# Patient Record
Sex: MALE | Race: WHITE | Employment: FULL TIME | URBAN - METROPOLITAN AREA
[De-identification: names, ages, dates, MRNs, and addresses within clinical notes are randomized per-mention and may not be internally consistent; named-entity substitution may affect disease eponyms.]

---

## 2018-10-27 ENCOUNTER — HOSPITAL ENCOUNTER (OUTPATIENT)
Age: 58
Setting detail: OBSERVATION
Discharge: HOME | End: 2018-10-28
Attending: EMERGENCY MEDICINE | Admitting: INTERNAL MEDICINE
Payer: COMMERCIAL

## 2018-10-27 DIAGNOSIS — R07.9 ACUTE CHEST PAIN: Primary | ICD-10-CM

## 2018-10-27 PROBLEM — I20.0 UNSTABLE ANGINA (HCC): Status: ACTIVE | Noted: 2018-10-27

## 2018-10-27 LAB
ALBUMIN SERPL-MCNC: 3.9 G/DL (ref 3.5–5)
ALBUMIN/GLOB SERPL: 1.1 {RATIO} (ref 1.1–2.2)
ALP SERPL-CCNC: 65 U/L (ref 45–117)
ALT SERPL-CCNC: 41 U/L (ref 12–78)
ANION GAP SERPL CALC-SCNC: 15 MMOL/L (ref 5–15)
AST SERPL-CCNC: 21 U/L (ref 15–37)
BASOPHILS # BLD: 0 K/UL (ref 0–0.1)
BASOPHILS NFR BLD: 1 % (ref 0–1)
BILIRUB SERPL-MCNC: 0.5 MG/DL (ref 0.2–1)
BUN SERPL-MCNC: 19 MG/DL (ref 6–20)
BUN/CREAT SERPL: 15 (ref 12–20)
CALCIUM SERPL-MCNC: 8.6 MG/DL (ref 8.5–10.1)
CHLORIDE SERPL-SCNC: 103 MMOL/L (ref 97–108)
CO2 SERPL-SCNC: 22 MMOL/L (ref 21–32)
COMMENT, HOLDF: NORMAL
CREAT SERPL-MCNC: 1.24 MG/DL (ref 0.7–1.3)
DIFFERENTIAL METHOD BLD: ABNORMAL
EOSINOPHIL # BLD: 0.3 K/UL (ref 0–0.4)
EOSINOPHIL NFR BLD: 4 % (ref 0–7)
ERYTHROCYTE [DISTWIDTH] IN BLOOD BY AUTOMATED COUNT: 13.2 % (ref 11.5–14.5)
GLOBULIN SER CALC-MCNC: 3.6 G/DL (ref 2–4)
GLUCOSE BLD STRIP.AUTO-MCNC: 103 MG/DL (ref 65–100)
GLUCOSE BLD STRIP.AUTO-MCNC: 132 MG/DL (ref 65–100)
GLUCOSE SERPL-MCNC: 98 MG/DL (ref 65–100)
HCT VFR BLD AUTO: 47.8 % (ref 36.6–50.3)
HGB BLD-MCNC: 16.8 G/DL (ref 12.1–17)
IMM GRANULOCYTES # BLD: 0 K/UL (ref 0–0.04)
IMM GRANULOCYTES NFR BLD AUTO: 1 % (ref 0–0.5)
LYMPHOCYTES # BLD: 1.5 K/UL (ref 0.8–3.5)
LYMPHOCYTES NFR BLD: 23 % (ref 12–49)
MCH RBC QN AUTO: 31.2 PG (ref 26–34)
MCHC RBC AUTO-ENTMCNC: 35.1 G/DL (ref 30–36.5)
MCV RBC AUTO: 88.7 FL (ref 80–99)
MONOCYTES # BLD: 0.7 K/UL (ref 0–1)
MONOCYTES NFR BLD: 11 % (ref 5–13)
NEUTS SEG # BLD: 4.1 K/UL (ref 1.8–8)
NEUTS SEG NFR BLD: 61 % (ref 32–75)
NRBC # BLD: 0 K/UL (ref 0–0.01)
NRBC BLD-RTO: 0 PER 100 WBC
PLATELET # BLD AUTO: 212 K/UL (ref 150–400)
PMV BLD AUTO: 9.5 FL (ref 8.9–12.9)
POTASSIUM SERPL-SCNC: 3.4 MMOL/L (ref 3.5–5.1)
PROT SERPL-MCNC: 7.5 G/DL (ref 6.4–8.2)
RBC # BLD AUTO: 5.39 M/UL (ref 4.1–5.7)
SAMPLES BEING HELD,HOLD: NORMAL
SERVICE CMNT-IMP: ABNORMAL
SERVICE CMNT-IMP: ABNORMAL
SODIUM SERPL-SCNC: 140 MMOL/L (ref 136–145)
TROPONIN I SERPL-MCNC: <0.05 NG/ML
WBC # BLD AUTO: 6.7 K/UL (ref 4.1–11.1)

## 2018-10-27 PROCEDURE — 74011636320 HC RX REV CODE- 636/320: Performed by: INTERNAL MEDICINE

## 2018-10-27 PROCEDURE — 77030013744

## 2018-10-27 PROCEDURE — 99218 HC RM OBSERVATION: CPT

## 2018-10-27 PROCEDURE — C1887 CATHETER, GUIDING: HCPCS

## 2018-10-27 PROCEDURE — 82962 GLUCOSE BLOOD TEST: CPT

## 2018-10-27 PROCEDURE — 74011250637 HC RX REV CODE- 250/637: Performed by: INTERNAL MEDICINE

## 2018-10-27 PROCEDURE — 74011250637 HC RX REV CODE- 250/637: Performed by: EMERGENCY MEDICINE

## 2018-10-27 PROCEDURE — 36415 COLL VENOUS BLD VENIPUNCTURE: CPT | Performed by: EMERGENCY MEDICINE

## 2018-10-27 PROCEDURE — 85025 COMPLETE CBC W/AUTO DIFF WBC: CPT | Performed by: EMERGENCY MEDICINE

## 2018-10-27 PROCEDURE — 93005 ELECTROCARDIOGRAM TRACING: CPT

## 2018-10-27 PROCEDURE — 74011250636 HC RX REV CODE- 250/636: Performed by: INTERNAL MEDICINE

## 2018-10-27 PROCEDURE — C1769 GUIDE WIRE: HCPCS

## 2018-10-27 PROCEDURE — 77030004533 HC CATH ANGI DX IMP BSC -B

## 2018-10-27 PROCEDURE — 74011000258 HC RX REV CODE- 258: Performed by: INTERNAL MEDICINE

## 2018-10-27 PROCEDURE — 92928 PRQ TCAT PLMT NTRAC ST 1 LES: CPT

## 2018-10-27 PROCEDURE — 77030013715 HC INFL SYS MRTM -B

## 2018-10-27 PROCEDURE — C1760 CLOSURE DEV, VASC: HCPCS

## 2018-10-27 PROCEDURE — C1874 STENT, COATED/COV W/DEL SYS: HCPCS

## 2018-10-27 PROCEDURE — 99285 EMERGENCY DEPT VISIT HI MDM: CPT

## 2018-10-27 PROCEDURE — 84484 ASSAY OF TROPONIN QUANT: CPT | Performed by: EMERGENCY MEDICINE

## 2018-10-27 PROCEDURE — C1894 INTRO/SHEATH, NON-LASER: HCPCS

## 2018-10-27 PROCEDURE — 80053 COMPREHEN METABOLIC PANEL: CPT | Performed by: EMERGENCY MEDICINE

## 2018-10-27 PROCEDURE — C1725 CATH, TRANSLUMIN NON-LASER: HCPCS

## 2018-10-27 RX ORDER — METOPROLOL SUCCINATE 25 MG/1
25 TABLET, EXTENDED RELEASE ORAL DAILY
Status: DISCONTINUED | OUTPATIENT
Start: 2018-10-28 | End: 2018-10-28 | Stop reason: HOSPADM

## 2018-10-27 RX ORDER — ATROPINE SULFATE 0.1 MG/ML
.5-1 INJECTION INTRAVENOUS AS NEEDED
Status: DISCONTINUED | OUTPATIENT
Start: 2018-10-27 | End: 2018-10-27

## 2018-10-27 RX ORDER — POTASSIUM CHLORIDE 750 MG/1
20 TABLET, FILM COATED, EXTENDED RELEASE ORAL DAILY
Status: DISCONTINUED | OUTPATIENT
Start: 2018-10-28 | End: 2018-10-28 | Stop reason: HOSPADM

## 2018-10-27 RX ORDER — PRASUGREL 10 MG/1
10 TABLET, FILM COATED ORAL DAILY
Status: DISCONTINUED | OUTPATIENT
Start: 2018-10-28 | End: 2018-10-28 | Stop reason: HOSPADM

## 2018-10-27 RX ORDER — PHENYLEPHRINE HCL IN 0.9% NACL 0.4MG/10ML
100-400 SYRINGE (ML) INTRAVENOUS
Status: DISCONTINUED | OUTPATIENT
Start: 2018-10-27 | End: 2018-10-27

## 2018-10-27 RX ORDER — SODIUM CHLORIDE 9 MG/ML
3 INJECTION, SOLUTION INTRAVENOUS CONTINUOUS
Status: DISPENSED | OUTPATIENT
Start: 2018-10-27 | End: 2018-10-27

## 2018-10-27 RX ORDER — SODIUM CHLORIDE 0.9 % (FLUSH) 0.9 %
5-10 SYRINGE (ML) INJECTION AS NEEDED
Status: DISCONTINUED | OUTPATIENT
Start: 2018-10-27 | End: 2018-10-28 | Stop reason: HOSPADM

## 2018-10-27 RX ORDER — HEPARIN SODIUM 200 [USP'U]/100ML
2000 INJECTION, SOLUTION INTRAVENOUS AS NEEDED
Status: DISCONTINUED | OUTPATIENT
Start: 2018-10-27 | End: 2018-10-27

## 2018-10-27 RX ORDER — PRASUGREL 10 MG/1
10-60 TABLET, FILM COATED ORAL AS NEEDED
Status: DISCONTINUED | OUTPATIENT
Start: 2018-10-27 | End: 2018-10-27

## 2018-10-27 RX ORDER — SODIUM CHLORIDE 0.9 % (FLUSH) 0.9 %
5-10 SYRINGE (ML) INJECTION EVERY 8 HOURS
Status: DISCONTINUED | OUTPATIENT
Start: 2018-10-27 | End: 2018-10-28 | Stop reason: HOSPADM

## 2018-10-27 RX ORDER — LANOLIN ALCOHOL/MO/W.PET/CERES
500 CREAM (GRAM) TOPICAL
Status: DISCONTINUED | OUTPATIENT
Start: 2018-10-27 | End: 2018-10-28 | Stop reason: HOSPADM

## 2018-10-27 RX ORDER — FENTANYL CITRATE 50 UG/ML
25-200 INJECTION, SOLUTION INTRAMUSCULAR; INTRAVENOUS AS NEEDED
Status: DISCONTINUED | OUTPATIENT
Start: 2018-10-27 | End: 2018-10-27

## 2018-10-27 RX ORDER — HEPARIN SODIUM 1000 [USP'U]/ML
1000-10000 INJECTION, SOLUTION INTRAVENOUS; SUBCUTANEOUS AS NEEDED
Status: DISCONTINUED | OUTPATIENT
Start: 2018-10-27 | End: 2018-10-27

## 2018-10-27 RX ORDER — CLOPIDOGREL 300 MG/1
300-600 TABLET, FILM COATED ORAL AS NEEDED
Status: DISCONTINUED | OUTPATIENT
Start: 2018-10-27 | End: 2018-10-27

## 2018-10-27 RX ORDER — SODIUM CHLORIDE 9 MG/ML
1.5 INJECTION, SOLUTION INTRAVENOUS CONTINUOUS
Status: DISPENSED | OUTPATIENT
Start: 2018-10-27 | End: 2018-10-27

## 2018-10-27 RX ORDER — ATORVASTATIN CALCIUM 20 MG/1
20 TABLET, FILM COATED ORAL EVERY EVENING
Status: DISCONTINUED | OUTPATIENT
Start: 2018-10-27 | End: 2018-10-28 | Stop reason: HOSPADM

## 2018-10-27 RX ORDER — ASPIRIN 81 MG/1
81 TABLET ORAL DAILY
Status: DISCONTINUED | OUTPATIENT
Start: 2018-10-28 | End: 2018-10-28 | Stop reason: HOSPADM

## 2018-10-27 RX ORDER — MIDAZOLAM HYDROCHLORIDE 1 MG/ML
.5-1 INJECTION, SOLUTION INTRAMUSCULAR; INTRAVENOUS AS NEEDED
Status: DISCONTINUED | OUTPATIENT
Start: 2018-10-27 | End: 2018-10-27

## 2018-10-27 RX ORDER — LIDOCAINE HYDROCHLORIDE 10 MG/ML
10-30 INJECTION INFILTRATION; PERINEURAL
Status: DISCONTINUED | OUTPATIENT
Start: 2018-10-27 | End: 2018-10-27

## 2018-10-27 RX ORDER — SODIUM CHLORIDE 0.9 % (FLUSH) 0.9 %
5-10 SYRINGE (ML) INJECTION AS NEEDED
Status: DISCONTINUED | OUTPATIENT
Start: 2018-10-27 | End: 2018-10-27

## 2018-10-27 RX ADMIN — FENTANYL CITRATE 50 MCG: 50 INJECTION INTRAMUSCULAR; INTRAVENOUS at 16:02

## 2018-10-27 RX ADMIN — IOPAMIDOL 50 ML: 755 INJECTION, SOLUTION INTRAVENOUS at 16:13

## 2018-10-27 RX ADMIN — IOPAMIDOL 109 ML: 755 INJECTION, SOLUTION INTRAVENOUS at 16:27

## 2018-10-27 RX ADMIN — LIDOCAINE HYDROCHLORIDE 10 ML: 10 INJECTION, SOLUTION INFILTRATION; PERINEURAL at 16:02

## 2018-10-27 RX ADMIN — NITROGLYCERIN 1 INCH: 20 OINTMENT TOPICAL at 14:41

## 2018-10-27 RX ADMIN — SODIUM CHLORIDE 3 ML/KG/HR: 900 INJECTION, SOLUTION INTRAVENOUS at 16:02

## 2018-10-27 RX ADMIN — MIDAZOLAM 2 MG: 1 INJECTION INTRAMUSCULAR; INTRAVENOUS at 16:06

## 2018-10-27 RX ADMIN — PRASUGREL HYDROCHLORIDE 60 MG: 10 TABLET, FILM COATED ORAL at 16:23

## 2018-10-27 RX ADMIN — Medication 10 ML: at 16:02

## 2018-10-27 RX ADMIN — Medication 10 ML: at 21:12

## 2018-10-27 RX ADMIN — Medication 2000 UNITS: at 16:02

## 2018-10-27 RX ADMIN — FENTANYL CITRATE 25 MCG: 50 INJECTION INTRAMUSCULAR; INTRAVENOUS at 16:06

## 2018-10-27 RX ADMIN — MIDAZOLAM 1 MG: 1 INJECTION INTRAMUSCULAR; INTRAVENOUS at 16:04

## 2018-10-27 RX ADMIN — BIVALIRUDIN 152.43 MG/HR: 250 INJECTION, POWDER, LYOPHILIZED, FOR SOLUTION INTRAVENOUS at 16:13

## 2018-10-27 RX ADMIN — FENTANYL CITRATE 25 MCG: 50 INJECTION INTRAMUSCULAR; INTRAVENOUS at 16:04

## 2018-10-27 RX ADMIN — ATORVASTATIN CALCIUM 20 MG: 20 TABLET, FILM COATED ORAL at 19:11

## 2018-10-27 RX ADMIN — MIDAZOLAM 2 MG: 1 INJECTION INTRAMUSCULAR; INTRAVENOUS at 16:02

## 2018-10-27 RX ADMIN — Medication 500 MG: at 21:12

## 2018-10-27 NOTE — PROGRESS NOTES
TRANSFER - OUT REPORT: 
 
Verbal report given to Kenia(name) on Salome Ram.  being transferred to [facility](unit) for routine progression of care Report consisted of patients Situation, Background, Assessment and  
Recommendations(SBAR). Information from the following report(s) Procedure Summary and MAR was reviewed with the receiving nurse. Lines:  
Peripheral IV 10/27/18 Left Hand (Active) Site Assessment Clean, dry, & intact 10/27/2018  3:27 PM  
Phlebitis Assessment 0 10/27/2018  3:27 PM  
Infiltration Assessment 0 10/27/2018  3:27 PM  
Dressing Status Clean, dry, & intact 10/27/2018  3:27 PM  
Dressing Type Tape;Transparent 10/27/2018  3:27 PM  
Hub Color/Line Status Green;Capped;Flushed;Patent 10/27/2018  3:27 PM  
Action Taken Blood drawn 10/27/2018  3:27 PM  
  
 
Opportunity for questions and clarification was provided. Patient transported with: 
 Registered Nurse

## 2018-10-27 NOTE — H&P
Cardiology History and Physical 
 
 Date of  Admission: 10/27/2018 Admission type: Emergency Mustapha Randall. is a 62 y.o. male  is admitted for chest pain. It started at 11:00 at work. He was climbing stairs when it happened. It is SS tightness with Lt jaw radiation and Lt shoulder radiation with SOB. En route he was given a sl NTG which helped some but CP is still 4/10. He has known CAD and s/p LAD stent in 12/16. He has not had any stress test since then. He has chest fluttering like palpitations at rest lately. There are no active problems to display for this patient. Rachel Wooten DO Past Medical History:  
Diagnosis Date  Diabetes ([facility] [address]  Hypertension  Ill-defined condition   
 elevated cholesterol History reviewed. No pertinent family history. Prior to Admission medications Medication Sig Start Date End Date Taking? Authorizing Provider  
atorvastatin (LIPITOR) 40 mg tablet Take 0.5 Tabs by mouth every evening. 12/3/16   Surjit Carlson MD  
prasugrel (EFFIENT) 10 mg tablet Take 1 Tab by mouth daily. 12/3/16   Surjit Carlosn MD  
potassium chloride (KLOR-CON M20) 20 mEq tablet Take 1 Tab by mouth daily. Take 2 tablet this evening then 1 tablet every day until finished 12/3/16   Surjit Carlson MD  
aspirin delayed-release 81 mg tablet Take  by mouth daily. Provider, Historical  
CANAGLIFLOZIN (INVOKANA PO) Take  by mouth. Provider, Historical  
atenolol-chlorthalidone (TENORETIC) 100-25 mg per tablet Take 1 Tab by mouth daily. Provider, Historical  
niacin ER (NIASPAN) 500 mg tablet Take 500 mg by mouth nightly. Provider, Historical  
  
No Known Allergies Review of Systems Review of Systems Except as noted in HPI, patient denies recent fever or chills, nausea, vomiting, diarrhea, hemoptysis, hematemesis, dysuria, myalgias, focal neurologic symptoms, ecchymosis, angioedema, odynophagia, dysphagia, sore throat, earache,rash, melena, hematochezia, depression, GERD, cold intolerance, petechia, bleeding gums, or significant weight loss. A comprehensive review of systems was negative except for that written in the HPI. Physical Exam 
 
Objective:  
 Physical Exam: 
24 hr VS reviewed, overall VSSAF Temp (24hrs), Av.5 °F (36.9 °C), Min:98.5 °F (36.9 °C), Max:98.5 °F (36.9 °C) Patient Vitals for the past 8 hrs: 
 Pulse 10/27/18 1445 62  
10/27/18 1441 62  
10/27/18 1430 65  
10/27/18 1415 61  
10/27/18 1405 60 Patient Vitals for the past 8 hrs: 
 Resp  
10/27/18 1445 15  
10/27/18 1430 14  
10/27/18 1415 16  
10/27/18 1405 12 Patient Vitals for the past 8 hrs: 
 BP  
10/27/18 1445 134/75  
10/27/18 1441 124/74  
10/27/18 1430 124/74  
10/27/18 1415 126/74  
10/27/18 1405 138/70 No intake or output data in the 24 hours ending 10/27/18 1451 General Appearance:   [x] well developed, well nourished,  [x]NAD. []     agitated   []     lethargic but arousable  []     obtunded ENT, Palate:    [x] WNL   []     dry palate/MM     [x]anicteric Respiratory:    [x]CTA bilateral  [] rales  [] rhonchi  [] normal resp effort Cardiovascular:   [x] RRR   [] Irregular rate and rhythm  [] ectopy [x] Normal S1, S2   [x]  S4 gallop noted 
 [] no murmur   [] murmur c/w: 
 [x]  no edema RLE:[]1+ [] 2+ []3+; LLE:[]1+ []2+ [] 3+ [x]  normal JVP   []     Elevated JVP [x] carotid upstroke nl 
 [x] abd aorta not palpated 
 [x] no stigmata of peripheral emboli GI:    [x] abd soft, non-distented,bowel sounds present, no                     organomegaly appreciated Skin: 
Neuro: 
 
  [x]  warm and dry []     cold extremities [x]  A/O x 3,  [x]     grossly non-focal  
 []  lethargic but arousable  [] obtunded Cardiographics Telemetry: normal sinus rhythm ECG: normal EKG, normal sinus rhythm, unchanged from previous tracings Echocardiogram: Not done Labs: Recent Results (from the past 24 hour(s)) EKG, 12 LEAD, INITIAL Collection Time: 10/27/18  2:03 PM  
Result Value Ref Range Ventricular Rate 60 BPM  
 Atrial Rate 60 BPM  
 P-R Interval 180 ms QRS Duration 86 ms  
 Q-T Interval 422 ms QTC Calculation (Bezet) 422 ms Calculated P Axis 25 degrees Calculated T Axis 22 degrees Diagnosis Normal sinus rhythm When compared with ECG of 03-DEC-2016 12:18, No significant change was found CBC WITH AUTOMATED DIFF Collection Time: 10/27/18  2:10 PM  
Result Value Ref Range WBC 6.7 4.1 - 11.1 K/uL  
 RBC 5.39 4. 10 - 5.70 M/uL  
 HGB 16.8 12.1 - 17.0 g/dL HCT 47.8 36.6 - 50.3 % MCV 88.7 80.0 - 99.0 FL  
 MCH 31.2 26.0 - 34.0 PG  
 MCHC 35.1 30.0 - 36.5 g/dL  
 RDW 13.2 11.5 - 14.5 % PLATELET 933 011 - 867 K/uL MPV 9.5 8.9 - 12.9 FL  
 NRBC 0.0 0  WBC ABSOLUTE NRBC 0.00 0.00 - 0.01 K/uL NEUTROPHILS 61 32 - 75 % LYMPHOCYTES 23 12 - 49 % MONOCYTES 11 5 - 13 % EOSINOPHILS 4 0 - 7 % BASOPHILS 1 0 - 1 % IMMATURE GRANULOCYTES 1 (H) 0.0 - 0.5 % ABS. NEUTROPHILS 4.1 1.8 - 8.0 K/UL  
 ABS. LYMPHOCYTES 1.5 0.8 - 3.5 K/UL  
 ABS. MONOCYTES 0.7 0.0 - 1.0 K/UL  
 ABS. EOSINOPHILS 0.3 0.0 - 0.4 K/UL  
 ABS. BASOPHILS 0.0 0.0 - 0.1 K/UL  
 ABS. IMM. GRANS. 0.0 0.00 - 0.04 K/UL  
 DF AUTOMATED METABOLIC PANEL, COMPREHENSIVE Collection Time: 10/27/18  2:10 PM  
Result Value Ref Range Sodium 140 136 - 145 mmol/L Potassium 3.4 (L) 3.5 - 5.1 mmol/L Chloride 103 97 - 108 mmol/L  
 CO2 22 21 - 32 mmol/L Anion gap 15 5 - 15 mmol/L Glucose 98 65 - 100 mg/dL BUN 19 6 - 20 MG/DL Creatinine 1.24 0.70 - 1.30 MG/DL  
 BUN/Creatinine ratio 15 12 - 20 GFR est AA >60 >60 ml/min/1.73m2 GFR est non-AA 60 (L) >60 ml/min/1.73m2 Calcium 8.6 8.5 - 10.1 MG/DL Bilirubin, total 0.5 0.2 - 1.0 MG/DL  
 ALT (SGPT) 41 12 - 78 U/L  
 AST (SGOT) 21 15 - 37 U/L Alk.  phosphatase 65 45 - 117 U/L  
 Protein, total 7.5 6.4 - 8.2 g/dL Albumin 3.9 3.5 - 5.0 g/dL Globulin 3.6 2.0 - 4.0 g/dL A-G Ratio 1.1 1.1 - 2.2    
TROPONIN I Collection Time: 10/27/18  2:10 PM  
Result Value Ref Range Troponin-I, Qt. <0.05 <0.05 ng/mL SAMPLES BEING HELD Collection Time: 10/27/18  2:10 PM  
Result Value Ref Range SAMPLES BEING HELD 1RED,1BLUE   
 COMMENT Add-on orders for these samples will be processed based on acceptable specimen integrity and analyte stability, which may vary by analyte. Assessment: 
 
 Assessment:  
  
Active Problems: * No active hospital problems. * 
CP; radiating to Lt jaw; consistent with [place] 
CAD S/p stent in LAD 
HTN 
hyperlipidemia Plan:  
Tele NTP 
BB 
ASA He is still having ongoing chest pain and thus he is recommended for cardiac cath Signed By: Felix Parsons MD   
 October 27, 2018

## 2018-10-27 NOTE — ED TRIAGE NOTES
Pt arrives via EMS from work for substernal CP with radiation to jaw. Four 81mg chewable aspirin given prior to EMS arrival, 1 nitro given by EMS relieving jaw pain but still chest pressure. Hx of stent placement.

## 2018-10-27 NOTE — PROCEDURES
Cardiac Catheterization Procedure Note   Patient: Alhaji Castellanos MRN: [id number]  SSN: [id number]   YOB: 1960 Age: 62 y.o.   Sex: male    Date of Procedure: 10/27/2018   Pre-procedure Diagnosis: Unstable Angina  Post-procedure Diagnosis: Coronary Artery Disease  Procedure: PCI  :  Dr. Criss Paniagua MD    Assistant(s):  None  Anesthesia: Moderate Sedation   Estimated Blood Loss: Less than 10 mL   Specimens Removed: None  Findings: LVG; 60%; EDP is 8 mmHg; LM is normal; LCX with minimal lesion; LAD with proximal stent widely patent and small D1 has origin 75% lesion but trapped by LAD stent and small branch, not worth treating; RCA is huge dominant vessel with proximal 85% lesion, stented today with 3.5x22 [place] upto 15 roshan with excellent result  Complications: None   Implants:  None  Signed by:  Criss Paniagua MD  10/27/2018  4:30 PM

## 2018-10-27 NOTE — PROGRESS NOTES
Cardiac Cath Lab Procedure Area Arrival Note: 
 
Telma Norman. arrived to Cardiac Cath Lab, Procedure Area. Patient identifiers verified with NAME and DATE OF BIRTH. Procedure verified with patient. Consent forms verified. Allergies verified. Patient informed of procedure and plan of care. Questions answered with review. Patient voiced understanding of procedure and plan of care. Patient on cardiac monitor, non-invasive blood pressure, SPO2 monitor. On O2 @ 2 lpm via NC.  IV of NS on pump at 261 ml/hr. Patient status doing well without problems. Patient is A&Ox 4. Patient reports 7/10 chest pain. Patient medicated during procedure with orders obtained and verified by Dr. Jake Alcocer. Refer to patients Cardiac Cath Lab PROCEDURE REPORT for vital signs, assessment, status, and response during procedure, printed at end of case. Printed report on chart or scanned into chart.

## 2018-10-27 NOTE — ED PROVIDER NOTES
62 y.o. male with past medical history significant for HTN, DM, hypercholesterolemia who presents via EMS with chief complaint of CP. Pt c/o acute onset medial CP with radiation to his L jaw and L neck at 1100 this AM right after going up two flights of stairs. Pt reports that the pain has improved, and is now a 4/10. Pt denies associated SOB or diaphoresis. Pt had a stent placed by Dr. Alexander Love in 2016 but reports these sx do not feel similar. Pt denies hx of MI. Pt endorses family hx of MI (paternal, age 39). Pt has had intermittent palpitations recently and has an appointment to see Dr. Alexander Love on Wednesday. There are no other acute medical concerns at this time. Social hx: denies tobacco use, +occasional EtOH consumption PCP: Emerita Barrett DO Note written by Gilberto Sanon, as dictated by Delaney Phillips MD 2:28 PM 
 
 
 
The history is provided by the patient. No  was used. Past Medical History:  
Diagnosis Date  Diabetes ([address]  Hypertension  Ill-defined condition   
 elevated cholesterol Past Surgical History:  
Procedure Laterality Date  HX CAROTID STENT  12/2016 Dr Alexander Love History reviewed. No pertinent family history. Social History Socioeconomic History  Marital status:  Spouse name: Not on file  Number of children: Not on file  Years of education: Not on file  Highest education level: Not on file Social Needs  Financial resource strain: Not on file  Food insecurity - worry: Not on file  Food insecurity - inability: Not on file  Transportation needs - medical: Not on file  Transportation needs - non-medical: Not on file Occupational History  Not on file Tobacco Use  Smoking status: Never Smoker  Smokeless tobacco: Never Used Substance and Sexual Activity  Alcohol use: Yes Comment: occasionally  Drug use: No  
 Sexual activity: Not on file Other Topics Concern  Not on file Social History Narrative  Not on file ALLERGIES: Patient has no known allergies. Review of Systems Constitutional: Negative for appetite change, chills and fever. HENT: Negative for rhinorrhea, sore throat and trouble swallowing. Eyes: Negative for photophobia. Respiratory: Negative for cough and shortness of breath. Cardiovascular: Positive for chest pain and palpitations. Gastrointestinal: Negative for abdominal pain, nausea and vomiting. Genitourinary: Negative for dysuria, frequency and hematuria. Musculoskeletal: Positive for neck pain. Negative for arthralgias. Neurological: Negative for dizziness, syncope and weakness. Psychiatric/Behavioral: Negative for behavioral problems. The patient is not nervous/anxious. All other systems reviewed and are negative. Vitals:  
 10/27/18 1405 BP: 138/70 Pulse: 60 Resp: 12 Temp: 98.5 °F (36.9 °C) SpO2: 93% Weight: 87.1 kg (192 lb) Height: 5' 7\" (1.702 m) Physical Exam  
Constitutional: He appears well-developed and well-nourished. No distress. HENT:  
Head: Normocephalic. Eyes: Pupils are equal, round, and reactive to light. Neck: Normal range of motion. Cardiovascular: Normal rate and regular rhythm. No murmur heard. Pulmonary/Chest: Effort normal and breath sounds normal. No respiratory distress. Abdominal: Soft. There is no tenderness. Musculoskeletal: Normal range of motion. He exhibits no edema. Neurological: He is alert. He has normal strength. No cranial nerve deficit. Skin: Skin is warm and dry. Psychiatric: He has a normal mood and affect. His behavior is normal.  
Nursing note and vitals reviewed. Note written by Gilberto Vazquez, as dictated by Hugo Perea MD 2:39 PM 
 
 
 
 
MDM Procedures CONSULT NOTE: 
2:39 PM Hugo Perea MD spoke with Dr. Parker Mishra, Consult for Cardiology. Discussed available diagnostic tests and clinical findings. Dr. Pancho Lr will come see the pt. ED EKG interpretation: 
Rhythm: normal sinus rhythm; and regular . Rate (approx.): 60; Axis: normal; P wave: normal; QRS interval: normal ; ST/T wave: normal; in  Lead: ; Other findings: . This EKG was interpreted by Pilar Porter MD,ED Provider.  3:26 PM

## 2018-10-28 VITALS
OXYGEN SATURATION: 92 % | DIASTOLIC BLOOD PRESSURE: 94 MMHG | RESPIRATION RATE: 16 BRPM | WEIGHT: 192 LBS | TEMPERATURE: 97.6 F | HEIGHT: 67 IN | BODY MASS INDEX: 30.13 KG/M2 | HEART RATE: 60 BPM | SYSTOLIC BLOOD PRESSURE: 142 MMHG

## 2018-10-28 LAB
ATRIAL RATE: 60 BPM
CALCULATED P AXIS, ECG09: 25 DEGREES
CALCULATED T AXIS, ECG11: 22 DEGREES
DIAGNOSIS, 93000: NORMAL
GLUCOSE BLD STRIP.AUTO-MCNC: 129 MG/DL (ref 65–100)
P-R INTERVAL, ECG05: 180 MS
Q-T INTERVAL, ECG07: 422 MS
QRS DURATION, ECG06: 86 MS
QTC CALCULATION (BEZET), ECG08: 422 MS
SERVICE CMNT-IMP: ABNORMAL
TROPONIN I SERPL-MCNC: 0.42 NG/ML
VENTRICULAR RATE, ECG03: 60 BPM

## 2018-10-28 PROCEDURE — 74011250637 HC RX REV CODE- 250/637: Performed by: INTERNAL MEDICINE

## 2018-10-28 PROCEDURE — 82962 GLUCOSE BLOOD TEST: CPT

## 2018-10-28 PROCEDURE — 36415 COLL VENOUS BLD VENIPUNCTURE: CPT | Performed by: INTERNAL MEDICINE

## 2018-10-28 PROCEDURE — 84484 ASSAY OF TROPONIN QUANT: CPT | Performed by: INTERNAL MEDICINE

## 2018-10-28 PROCEDURE — 99218 HC RM OBSERVATION: CPT

## 2018-10-28 RX ORDER — PRASUGREL 10 MG/1
10 TABLET, FILM COATED ORAL DAILY
Qty: 30 TAB | Refills: 6 | Status: SHIPPED | OUTPATIENT
Start: 2018-10-28

## 2018-10-28 RX ADMIN — ATENOLOL: 50 TABLET ORAL at 08:04

## 2018-10-28 RX ADMIN — ASPIRIN 81 MG: 81 TABLET, COATED ORAL at 09:38

## 2018-10-28 RX ADMIN — POTASSIUM CHLORIDE 20 MEQ: 750 TABLET, FILM COATED, EXTENDED RELEASE ORAL at 08:05

## 2018-10-28 RX ADMIN — METOPROLOL SUCCINATE 25 MG: 25 TABLET, EXTENDED RELEASE ORAL at 08:04

## 2018-10-28 RX ADMIN — PRASUGREL HYDROCHLORIDE 10 MG: 10 TABLET, FILM COATED ORAL at 08:05

## 2018-10-28 NOTE — PROGRESS NOTES
0730 Bedside shift change report given to Radha Mahajan RN (oncoming nurse) by Christina Johnson RN (offgoing nurse). Report included the following information SBAR, Kardex, ED Summary, Procedure Summary, Intake/Output, MAR, Accordion, Recent Results and Med Rec Status.

## 2018-10-28 NOTE — PROGRESS NOTES
3149: Bedside and Verbal shift change report given to JARAD Freeman (oncoming nurse) by Moreno Juárez (offgoing nurse).  Report included the following information SBAR, Kardex, Intake/Output, MAR, Recent Results, Med Rec Status and Cardiac Rhythm NSR/ SB.

## 2018-10-28 NOTE — DISCHARGE INSTRUCTIONS
[email or web address]    Patient Discharge Instructions    Jannette Merlin / [id number] : 1960    Admitted 10/27/2018 Discharged: 10/28/2018       · It is important that you take the medication exactly as they are prescribed. · Keep your medication in the bottles provided by the pharmacist and keep a list of the medication names, dosages, and times to be taken in your wallet. · Do not take other medications without consulting your doctor. BRING ALL OF YOUR MEDICINES TO YOUR OFFICE VISIT with Dr. Chelsey Bridges with Dr. Sania Gongora in 2 week      Cardiac Catheterization  Discharge Instructions     Do not drive, operate any machinery, or sign any legal documents for 24 hours after your procedure. You must have someone to drive you home.  You may take a shower 24 hours after your cardiac catheterization. Be sure to get the dressing wet and then remove it; gently wash the area with warm soapy water. Pat dry and leave open to air. To help prevent infections, be sure to keep the cath site clean and dry. No lotions, creams, powders, ointments, etc. in the cath site for approximately 1 week.  Do not take a tub bath, get in a hot tub or swimming pool for approximately 5 days or until the cath site is completely healed.  No strenuous activity or heavy lifting over 10 lbs. for 7 days.  Drink plenty of fluids for 24-48 hours after your cath to flush the contrast dye from your kidneys. No alcoholic beverages for 24 hours. You may resume your previous diet (low fat, low cholesterol) after your cath.  After your cath, some bruising or discomfort is common during the healing process. Tylenol, 1-2 tablets every 6 hours as needed, is recommended if you experience any discomfort.   If you experience any signs or symptoms of infection such as fever, chills, or poorly healing incision, persistent tenderness or swelling in the groin, redness and/or warmth to the touch, numbness, significant tingling or pain at the groin site or affected extremity, rash, drainage from the cath site, or if the leg feels tight or swollen, call your physician right away.  If bleeding at the cath site occurs, take a clean gauze pad and apply direct pressure to the groin just above the puncture site. Call 911 immediately, and continue to apply direct pressure until an ambulance gets to your location.  You may return to work  2  days after your cardiac cath if no groin bleeding. Information obtained by :  I understand that if any problems occur once I am at home I am to contact my physician. I understand and acknowledge receipt of the instructions indicated above.                                                                                                                                            R.N.'s Signature                                                                  Date/Time                                                                                                                                              Patient or Representative Signature                                                          Date/Time      Arleth Grajeda MD

## 2018-10-28 NOTE — DISCHARGE SUMMARY
Cardiology Discharge Summary     Patient ID:  Liss Cuellar  [id number]  85 y.o.  1960    Admit Date: 10/27/2018    Discharge Date: 10/28/2018     Admitting Physician: Senia Darby MD     Discharge Physician: Senia Darby MD    Admission Diagnoses: Unstable angina [facility])    Discharge Diagnoses: Active Problems:    Unstable angina ([address] (10/27/2018)    s/p stent in RCA  H/o stent in LAD, patent  Preserved; EF; 60%  HTN  hyperlipidemia    Discharge Condition: Stable    Cardiology Procedures this Admission:  Left heart catheterization with PCI    Hospital Course: He presented with [place] and prolonged CP. His cath showed a tight proximal RCA lesion, stented with 3.5x22 [place] with good result. His previous LAD stent was widelly patent. D1 origin lesion is unchanged but it is small vessel. EF was preserved at 60%. He is placed back on Effient and discharged in stable condition. Consults: None    Discharge Exam:     Visit Vitals  /82 (BP 1 Location: Left arm, BP Patient Position: At rest)   Pulse 60   Temp 97.7 °F (36.5 °C)   Resp 16   Ht 5' 7\" (1.702 m)   Wt 87.1 kg (192 lb)   SpO2 92%   BMI 30.07 kg/m²     General Appearance:  Well developed, well nourished, in no acute distress. Ears/Nose/Mouth/Throat:   Hearing grossly normal.         Neck: Supple. Chest:   Lungs clear to auscultation bilaterally. Normal resp effort. Cardiovascular:  Regular rate and rhythm, S1, S2 normal, no new murmur. Abdomen:   Soft, non-tender, bowel sounds are present. Extremities: No edema bilaterally. Neuro:  Skin: A/O x 3, grossly nonfocal. Normal mood/affect. Warm and dry. Disposition: home    Patient Instructions:   Current Discharge Medication List      CONTINUE these medications which have CHANGED    Details   ! ! prasugrel (EFFIENT) 10 mg tablet Take 1 Tab by mouth daily.   Qty: 30 Tab, Refills: 6            CONTINUE these medications which have NOT CHANGED    Details   atorvastatin (LIPITOR) 40 mg tablet Take 0.5 Tabs by mouth every evening. Qty: 30 Tab, Refills: 3      prasugrel (EFFIENT) 10 mg tablet Take 1 Tab by mouth daily. Qty: 90 Tab, Refills: 3      potassium chloride (KLOR-CON M20) 20 mEq tablet Take 1 Tab by mouth daily. Take 2 tablet this evening then 1 tablet every day until finished  Qty: 8 Tab, Refills: 0      aspirin delayed-release 81 mg tablet Take  by mouth daily. CANAGLIFLOZIN (INVOKANA PO) Take  by mouth. atenolol-chlorthalidone (TENORETIC) 100-25 mg per tablet Take 1 Tab by mouth daily. niacin ER (NIASPAN) 500 mg tablet Take 500 mg by mouth nightly. Referenced discharge instructions provided by nursing for diet and activity.     Follow-up with Dr. Facundo Read in two weeks     Signed:  Al García MD  10/28/2018  6:40 AM

## 2019-08-10 ENCOUNTER — HOSPITAL ENCOUNTER (EMERGENCY)
Age: 59
Discharge: HOME | End: 2019-08-10
Attending: EMERGENCY MEDICINE | Admitting: EMERGENCY MEDICINE
Payer: COMMERCIAL

## 2019-08-10 VITALS
RESPIRATION RATE: 14 BRPM | HEART RATE: 55 BPM | TEMPERATURE: 98.1 F | SYSTOLIC BLOOD PRESSURE: 126 MMHG | OXYGEN SATURATION: 92 % | DIASTOLIC BLOOD PRESSURE: 63 MMHG

## 2019-08-10 DIAGNOSIS — R07.89 ATYPICAL CHEST PAIN: ICD-10-CM

## 2019-08-10 DIAGNOSIS — R00.2 PALPITATIONS: Primary | ICD-10-CM

## 2019-08-10 LAB
ALBUMIN SERPL-MCNC: 4 G/DL (ref 3.5–5)
ALBUMIN/GLOB SERPL: 1.3 {RATIO} (ref 1.1–2.2)
ALP SERPL-CCNC: 68 U/L (ref 45–117)
ALT SERPL-CCNC: 50 U/L (ref 12–78)
ANION GAP SERPL CALC-SCNC: 11 MMOL/L (ref 5–15)
AST SERPL-CCNC: 24 U/L (ref 15–37)
BASOPHILS # BLD: 0 K/UL (ref 0–0.1)
BASOPHILS NFR BLD: 1 % (ref 0–1)
BILIRUB SERPL-MCNC: 0.7 MG/DL (ref 0.2–1)
BUN SERPL-MCNC: 15 MG/DL (ref 6–20)
BUN/CREAT SERPL: 16 (ref 12–20)
CALCIUM SERPL-MCNC: 9.4 MG/DL (ref 8.5–10.1)
CHLORIDE SERPL-SCNC: 105 MMOL/L (ref 97–108)
CK SERPL-CCNC: 71 U/L (ref 39–308)
CO2 SERPL-SCNC: 24 MMOL/L (ref 21–32)
COMMENT, HOLDF: NORMAL
CREAT SERPL-MCNC: 0.96 MG/DL (ref 0.7–1.3)
DIFFERENTIAL METHOD BLD: ABNORMAL
EOSINOPHIL # BLD: 0.2 K/UL (ref 0–0.4)
EOSINOPHIL NFR BLD: 4 % (ref 0–7)
ERYTHROCYTE [DISTWIDTH] IN BLOOD BY AUTOMATED COUNT: 12.9 % (ref 11.5–14.5)
GLOBULIN SER CALC-MCNC: 3.1 G/DL (ref 2–4)
GLUCOSE SERPL-MCNC: 96 MG/DL (ref 65–100)
HCT VFR BLD AUTO: 46.8 % (ref 36.6–50.3)
HGB BLD-MCNC: 16.4 G/DL (ref 12.1–17)
IMM GRANULOCYTES # BLD AUTO: 0 K/UL (ref 0–0.04)
IMM GRANULOCYTES NFR BLD AUTO: 1 % (ref 0–0.5)
LYMPHOCYTES # BLD: 1.9 K/UL (ref 0.8–3.5)
LYMPHOCYTES NFR BLD: 32 % (ref 12–49)
MCH RBC QN AUTO: 30.8 PG (ref 26–34)
MCHC RBC AUTO-ENTMCNC: 35 G/DL (ref 30–36.5)
MCV RBC AUTO: 87.8 FL (ref 80–99)
MONOCYTES # BLD: 0.7 K/UL (ref 0–1)
MONOCYTES NFR BLD: 12 % (ref 5–13)
NEUTS SEG # BLD: 3 K/UL (ref 1.8–8)
NEUTS SEG NFR BLD: 50 % (ref 32–75)
NRBC # BLD: 0 K/UL (ref 0–0.01)
NRBC BLD-RTO: 0 PER 100 WBC
PLATELET # BLD AUTO: 192 K/UL (ref 150–400)
PMV BLD AUTO: 9.8 FL (ref 8.9–12.9)
POTASSIUM SERPL-SCNC: 3.5 MMOL/L (ref 3.5–5.1)
PROT SERPL-MCNC: 7.1 G/DL (ref 6.4–8.2)
RBC # BLD AUTO: 5.33 M/UL (ref 4.1–5.7)
SAMPLES BEING HELD,HOLD: NORMAL
SODIUM SERPL-SCNC: 140 MMOL/L (ref 136–145)
TROPONIN I SERPL-MCNC: <0.05 NG/ML
WBC # BLD AUTO: 5.9 K/UL (ref 4.1–11.1)

## 2019-08-10 PROCEDURE — 85025 COMPLETE CBC W/AUTO DIFF WBC: CPT

## 2019-08-10 PROCEDURE — 74011250637 HC RX REV CODE- 250/637: Performed by: EMERGENCY MEDICINE

## 2019-08-10 PROCEDURE — 36415 COLL VENOUS BLD VENIPUNCTURE: CPT

## 2019-08-10 PROCEDURE — 80053 COMPREHEN METABOLIC PANEL: CPT

## 2019-08-10 PROCEDURE — 84484 ASSAY OF TROPONIN QUANT: CPT

## 2019-08-10 PROCEDURE — 99285 EMERGENCY DEPT VISIT HI MDM: CPT

## 2019-08-10 PROCEDURE — 93005 ELECTROCARDIOGRAM TRACING: CPT

## 2019-08-10 PROCEDURE — 82550 ASSAY OF CK (CPK): CPT

## 2019-08-10 RX ORDER — ASPIRIN 325 MG
325 TABLET ORAL
Status: COMPLETED | OUTPATIENT
Start: 2019-08-10 | End: 2019-08-10

## 2019-08-10 RX ORDER — NITROGLYCERIN 0.4 MG/1
0.4 TABLET SUBLINGUAL
Status: COMPLETED | OUTPATIENT
Start: 2019-08-10 | End: 2019-08-10

## 2019-08-10 RX ORDER — ACETAMINOPHEN 500 MG
1000 TABLET ORAL
Status: COMPLETED | OUTPATIENT
Start: 2019-08-10 | End: 2019-08-10

## 2019-08-10 RX ADMIN — ACETAMINOPHEN 1000 MG: 500 TABLET ORAL at 02:23

## 2019-08-10 RX ADMIN — NITROGLYCERIN 0.4 MG: 0.4 TABLET, ORALLY DISINTEGRATING SUBLINGUAL at 02:23

## 2019-08-10 RX ADMIN — ASPIRIN 325 MG: 325 TABLET, FILM COATED ORAL at 02:23

## 2019-08-10 NOTE — ED PROVIDER NOTES
61 y.o. male with past medical history significant for CAD, HTN, DM, HCL who presents from home with his Wife with chief complaint of chest pain. Pt states at 2300 last night he had onset of mid-sternal to left sided chest pain with the sensation that his heart was \"quivering\" and skipping beats. Pt notes the episode of \"quivering\" lasted for about 20 minutes prior to resolving, but he still has some dull chest pain. He further notes the pain started to radiate to his lower left neck. He contacted his Cardiologist, Dr. Mindy Boles PTA, who recommended him to be evaluated in the ED. He denies taking any medications at home. He denies any history of arrhythmia, but had his last stent placed in October 2019. He has stents placed in his RCA and proximal LAD. Pt specifically denies any diaphoresis, fever, chills, headache, shortness of breath,abdominal pain, nausea, vomiting. There are no other acute medical concerns at this time. PSHx: Significant for cardiac catheterization with stent placement   Social Hx: negative tobacco use, occasionally EtOH use, negative Illicit Drug use    PCP: Davey Mcknight DO    Note written by Gilberto Pickens, as dictated by Pedro Steward MD 1:58 AM    The history is provided by the patient. No  was used. Past Medical History:   Diagnosis Date    Diabetes ([address]     Hypertension     Ill-defined condition     elevated cholesterol       Past Surgical History:   Procedure Laterality Date    HX CAROTID STENT  12/2016    Dr Hugh Duckworth         History reviewed. No pertinent family history.     Social History     Socioeconomic History    Marital status:      Spouse name: Not on file    Number of children: Not on file    Years of education: Not on file    Highest education level: Not on file   Occupational History    Not on file   Social Needs    Financial resource strain: Not on file    Food insecurity:     Worry: Not on file     Inability: Not on file   Useful at Night needs:     Medical: Not on file     Non-medical: Not on file   Tobacco Use    Smoking status: Never Smoker    Smokeless tobacco: Never Used   Substance and Sexual Activity    Alcohol use: Yes     Comment: occasionally    Drug use: No    Sexual activity: Not on file   Lifestyle    Physical activity:     Days per week: Not on file     Minutes per session: Not on file    Stress: Not on file   Relationships    Social connections:     Talks on phone: Not on file     Gets together: Not on file     Attends Jain service: Not on file     Active member of club or organization: Not on file     Attends meetings of clubs or organizations: Not on file     Relationship status: Not on file    Intimate partner violence:     Fear of current or ex partner: Not on file     Emotionally abused: Not on file     Physically abused: Not on file     Forced sexual activity: Not on file   Other Topics Concern    Not on file   Social History Narrative    Not on file         ALLERGIES: Patient has no known allergies. Review of Systems   Constitutional: Negative for chills, diaphoresis and fever. HENT: Negative for congestion and sore throat. Eyes: Negative for pain. Respiratory: Negative for shortness of breath. Cardiovascular: Positive for chest pain and palpitations. Gastrointestinal: Negative for abdominal pain, diarrhea, nausea and vomiting. Genitourinary: Negative for dysuria and flank pain. Musculoskeletal: Positive for neck pain. Negative for back pain. Skin: Negative for rash. Neurological: Negative for dizziness and headaches. Vitals:    08/10/19 0300 08/10/19 0315 08/10/19 0330 08/10/19 0345   BP: 123/72 117/70 114/70 126/63   Pulse: (!) 59 (!) 55 (!) 59 (!) 55   Resp: 13 15 15 14   Temp:    98.1 °F (36.7 °C)   SpO2: 92% 93% 94% 92%            Physical Exam   Constitutional: He is oriented to person, place, and time. He appears well-developed and well-nourished. HENT:   Head: Normocephalic. Mouth/Throat: Oropharynx is clear and moist.   Eyes: Conjunctivae are normal.   Neck: Normal range of motion. Neck supple. Cardiovascular: Normal rate, regular rhythm and normal heart sounds. Pulmonary/Chest: Effort normal and breath sounds normal. No respiratory distress. Abdominal: Soft. Bowel sounds are normal. There is no tenderness. Musculoskeletal: Normal range of motion. Neurological: He is alert and oriented to person, place, and time. No gross motor or sensory deficits   Skin: Skin is warm. Capillary refill takes less than 2 seconds. No rash noted. Note written by Gilberto Bates, as dictated by Artur Joe MD 1:58 AM    MDM  Number of Diagnoses or Management Options  Atypical chest pain:   Palpitations:   Diagnosis management comments: 51-year-old male presenting to the ER with palpitations. EKG showing sinus bradycardia. Review of cardiac monitor while in the ER shows no dysrhythmias. No electrolyte abnormalities and normal troponin. Patient reports that he will follow-up with his cardiologist, Dr. Pablo Marsh and/or Complexity of Data Reviewed  Clinical lab tests: reviewed  Tests in the medicine section of CPT®: reviewed      ED Course as of Aug 10 1627   Sat Aug 10, 2019   0214 2:14 AM  EKG: NSR. Rate 64. Normal intervals, normal axis, no ST-elevations or depressions. No signs of ischemia.   Interpreted by Rhea Conway MD          [ZD]      ED Course User Index  [ZD] Artur Joe MD     Recent Results (from the past 24 hour(s))   EKG, 12 LEAD, INITIAL    Collection Time: 08/10/19 12:53 AM   Result Value Ref Range    Ventricular Rate 64 BPM    Atrial Rate 64 BPM    P-R Interval 172 ms    QRS Duration 86 ms    Q-T Interval 406 ms    QTC Calculation (Bezet) 418 ms    Calculated P Axis 35 degrees    Calculated R Axis 6 degrees    Calculated T Axis 30 degrees    Diagnosis       Normal sinus rhythm  When compared with ECG of 27-OCT-2018 14:03,  No significant change was found     TROPONIN I    Collection Time: 08/10/19  1:02 AM   Result Value Ref Range    Troponin-I, Qt. <0.05 <0.05 ng/mL   CBC WITH AUTOMATED DIFF    Collection Time: 08/10/19  1:02 AM   Result Value Ref Range    WBC 5.9 4.1 - 11.1 K/uL    RBC 5.33 4.10 - 5.70 M/uL    HGB 16.4 12.1 - 17.0 g/dL    HCT 46.8 36.6 - 50.3 %    MCV 87.8 80.0 - 99.0 FL    MCH 30.8 26.0 - 34.0 PG    MCHC 35.0 30.0 - 36.5 g/dL    RDW 12.9 11.5 - 14.5 %    PLATELET 780 675 - 264 K/uL    MPV 9.8 8.9 - 12.9 FL    NRBC 0.0 0  WBC    ABSOLUTE NRBC 0.00 0.00 - 0.01 K/uL    NEUTROPHILS 50 32 - 75 %    LYMPHOCYTES 32 12 - 49 %    MONOCYTES 12 5 - 13 %    EOSINOPHILS 4 0 - 7 %    BASOPHILS 1 0 - 1 %    IMMATURE GRANULOCYTES 1 (H) 0.0 - 0.5 %    ABS. NEUTROPHILS 3.0 1.8 - 8.0 K/UL    ABS. LYMPHOCYTES 1.9 0.8 - 3.5 K/UL    ABS. MONOCYTES 0.7 0.0 - 1.0 K/UL    ABS. EOSINOPHILS 0.2 0.0 - 0.4 K/UL    ABS. BASOPHILS 0.0 0.0 - 0.1 K/UL    ABS. IMM. GRANS. 0.0 0.00 - 0.04 K/UL    DF AUTOMATED     METABOLIC PANEL, COMPREHENSIVE    Collection Time: 08/10/19  1:02 AM   Result Value Ref Range    Sodium 140 136 - 145 mmol/L    Potassium 3.5 3.5 - 5.1 mmol/L    Chloride 105 97 - 108 mmol/L    CO2 24 21 - 32 mmol/L    Anion gap 11 5 - 15 mmol/L    Glucose 96 65 - 100 mg/dL    BUN 15 6 - 20 MG/DL    Creatinine 0.96 0.70 - 1.30 MG/DL    BUN/Creatinine ratio 16 12 - 20      GFR est AA >60 >60 ml/min/1.73m2    GFR est non-AA >60 >60 ml/min/1.73m2    Calcium 9.4 8.5 - 10.1 MG/DL    Bilirubin, total 0.7 0.2 - 1.0 MG/DL    ALT (SGPT) 50 12 - 78 U/L    AST (SGOT) 24 15 - 37 U/L    Alk.  phosphatase 68 45 - 117 U/L    Protein, total 7.1 6.4 - 8.2 g/dL    Albumin 4.0 3.5 - 5.0 g/dL    Globulin 3.1 2.0 - 4.0 g/dL    A-G Ratio 1.3 1.1 - 2.2     CK W/ REFLX CKMB    Collection Time: 08/10/19  1:02 AM   Result Value Ref Range    CK 71 39 - 308 U/L   SAMPLES BEING HELD    Collection Time: 08/10/19  1:13 AM   Result Value Ref Range    SAMPLES BEING HELD rd,bl,grn     COMMENT        Add-on orders for these samples will be processed based on acceptable specimen integrity and analyte stability, which may vary by analyte. No results found.     Procedures

## 2019-08-10 NOTE — ED NOTES
MD reviewed discharge instructions and options with patient and patient verbalized understanding. RN reviewed discharge instructions using teachback method. Pt ambulated to exit without difficulty and in no signs of acute distress escorted by his wife, and she  will drive home. No complaints or needs expressed at this time. Patient was counseled on medications prescribed at discharge. VSS, verbalized relief from most intense pain. Patient to call Dr. Renata Nolasco in the morning for appointment.

## 2019-08-10 NOTE — DISCHARGE INSTRUCTIONS
Patient Education        Chest Pain: Care Instructions  Your Care Instructions    There are many things that can cause chest pain. Some are not serious and will get better on their own in a few days. But some kinds of chest pain need more testing and treatment. Your doctor may have recommended a follow-up visit in the next 8 to 12 hours. If you are not getting better, you may need more tests or treatment. Even though your doctor has released you, you still need to watch for any problems. The doctor carefully checked you, but sometimes problems can develop later. If you have new symptoms or if your symptoms do not get better, get medical care right away. If you have worse or different chest pain or pressure that lasts more than 5 minutes or you passed out (lost consciousness), call 911 or seek other emergency help right away. A medical visit is only one step in your treatment. Even if you feel better, you still need to do what your doctor recommends, such as going to all suggested follow-up appointments and taking medicines exactly as directed. This will help you recover and help prevent future problems. How can you care for yourself at home? · Rest until you feel better. · Take your medicine exactly as prescribed. Call your doctor if you think you are having a problem with your medicine. · Do not drive after taking a prescription pain medicine. When should you call for help? Call 911 if:    · You passed out (lost consciousness).     · You have severe difficulty breathing.     · You have symptoms of a heart attack. These may include:  ? Chest pain or pressure, or a strange feeling in your chest.  ? Sweating. ? Shortness of breath. ? Nausea or vomiting. ? Pain, pressure, or a strange feeling in your back, neck, jaw, or upper belly or in one or both shoulders or arms. ? Lightheadedness or sudden weakness. ? A fast or irregular heartbeat.   After you call 911, the  may tell you to chew 1 adult-strength or 2 to 4 low-dose aspirin. Wait for an ambulance. Do not try to drive yourself.    Call your doctor today if:    · You have any trouble breathing.     · Your chest pain gets worse.     · You are dizzy or lightheaded, or you feel like you may faint.     · You are not getting better as expected.     · You are having new or different chest pain. Where can you learn more? Go to http://mey-yaneth.info/. Enter A120 in the search box to learn more about \"Chest Pain: Care Instructions. \"  Current as of: September 23, 2018  Content Version: 12.1  © 3342-2645 [employer]. Care instructions adapted under license by [employer] (which disclaims liability or warranty for this information). If you have questions about a medical condition or this instruction, always ask your healthcare professional. [place] [address] any warranty or liability for your use of this information. Patient Education        Palpitations: Care Instructions  Your Care Instructions    Heart palpitations are the uncomfortable sensation that your heart is beating fast or irregularly. You might feel pounding or fluttering in your chest. It might feel like your heart is skipping a beat. Although palpitations may be caused by a heart problem, they also occur because of stress, fatigue, or use of alcohol, caffeine, or nicotine. Many medicines, including diet pills, antihistamines, decongestants, and some herbal products, can cause heart palpitations. Nearly everyone has palpitations from time to time. Depending on your symptoms, your doctor may need to do more tests to try to find the cause of your palpitations. Follow-up care is a key part of your treatment and safety. Be sure to make and go to all appointments, and call your doctor if you are having problems. It's also a good idea to know your test results and keep a list of the medicines you take.   How can you care for yourself at home? · Avoid caffeine, nicotine, and excess alcohol. · Do not take illegal drugs, such as methamphetamines and cocaine. · Do not take weight loss or diet medicines unless you talk with your doctor first.  · Get plenty of sleep. · Do not overeat. · If you have palpitations again, take deep breaths and try to relax. This may slow a racing heart. · If you start to feel lightheaded, lie down to avoid injuries that might result if you pass out and fall down. · Keep a record of your palpitations and bring it to your next doctor's appointment. Write down:  ? The date and time. ? Your pulse. (If your heart is beating fast, it may be hard to count your pulse.)  ? What you were doing when the palpitations started. ? How long the palpitations lasted. ? Any other symptoms. · If an activity causes palpitations, slow down or stop. Talk to your doctor before you do that activity again. · Take your medicines exactly as prescribed. Call your doctor if you think you are having a problem with your medicine. When should you call for help? Call 911 anytime you think you may need emergency care. For example, call if:    · You passed out (lost consciousness).     · You have symptoms of a heart attack. These may include:  ? Chest pain or pressure, or a strange feeling in the chest.  ? Sweating. ? Shortness of breath. ? Pain, pressure, or a strange feeling in the back, neck, jaw, or upper belly or in one or both shoulders or arms. ? Lightheadedness or sudden weakness. ? A fast or irregular heartbeat. After you call 911, the  may tell you to chew 1 adult-strength or 2 to 4 low-dose aspirin. Wait for an ambulance. Do not try to drive yourself.     · You have symptoms of a stroke. These may include:  ? Sudden numbness, tingling, weakness, or loss of movement in your face, arm, or leg, especially on only one side of your body. ? Sudden vision changes. ? Sudden trouble speaking.   ? Sudden confusion or trouble understanding simple statements. ? Sudden problems with walking or balance. ? A sudden, severe headache that is different from past headaches.    Call your doctor now or seek immediate medical care if:    · You have heart palpitations and:  ? Are dizzy or lightheaded, or you feel like you may faint. ? Have new or increased shortness of breath.    Watch closely for changes in your health, and be sure to contact your doctor if:    · You continue to have heart palpitations. Where can you learn more? Go to http://mey-yaneth.info/. Enter R508 in the search box to learn more about \"Palpitations: Care Instructions. \"  Current as of: July 22, 2018  Content Version: 12.1  © 4460-8661 [employer]. Care instructions adapted under license by [employer] (which disclaims liability or warranty for this information). If you have questions about a medical condition or this instruction, always ask your healthcare professional. [address] any warranty or liability for your use of this information.

## 2019-08-10 NOTE — ED TRIAGE NOTES
[facility] [address]                      [address] [facility], [address] [place] [address]                                 OPERATIVE REPORT    PATIENT NAME: Sienna Aguilar                       :        1977  MED REC NO:   [id number]                          ROOM:  ACCOUNT NO:   [de-identified]                          ADMIT DATE: 2017  PROVIDER:     Meri Croft MD    DATE OF PROCEDURE:  2017    PREOPERATIVE DIAGNOSIS:  Umbilical hernia, bilateral groin pain. POSTOPERATIVE DIAGNOSIS:  Umbilical hernia, bilateral groin pain. PROCEDURE:  Diagnostic laparoscopy with umbilical hernia repair. SURGEON:  Meri Croft MD    ANESTHESIA:  General endotracheal.    ESTIMATED BLOOD LOSS:  Minimal.    COMPLICATIONS:  None. SPECIMEN:  None. OPERATIVE INDICATIONS AND CONSENT:  The patient is a 44-year-old male who  has previously had bilateral open inguinal hernia repaired with mesh. The  patient presented to the office recently with left groin pain. Physical  examination revealed no evidence of a left inguinal hernia. A previous CT  scan indicated a fat-containing inguinal hernia. This was felt to most  likely represent a lipoma of the cords. In any rate, the patient has  continued to complain of left groin pain and more recently right groin  pain. Plan is for diagnostic laparoscopy with possible bilateral inguinal  hernia repair with mesh and umbilical hernia repair. The patient was  explained the risks, benefits and possible complications including risks of  hernia recurrence, infection requiring mesh removal or nerve entrapment. DETAILS OF THE PROCEDURE:  The patient was brought to the operative suite  and placed in a supine position on the operative table. After general  endotracheal anesthesia, he was prepped and draped in the usual sterile  fashion. We next made a 2-cm curvilinear incision below the umbilicus.    The umbilicus was dissected free Patient comes in from home with complaints of palpitations, skipping heart beats and chest pain that started at 2200. Pain radiates to LEFT arm. circumferentially and detached. The  hernia sac was opened and trim flushed with the fascia. The hernia defect  was in the 1-cm range. A trocar was placed through the umbilical hernia  defect. The patient was then placed in Trendelenburg position. We could  clearly visualize the right side. There was no evidence of recurrent right  inguinal hernia. The patient had some adhesions in the left inguinal region. We introduced  a 5-mm trocar in the right lower quadrant. The adhesions to the left  inguinal region were then taken down using cautery. After the adhesions  were taken down, there was no evidence of recurrent left inguinal hernia. The 5-mm trocar was then removed under direct visualization and the abdomen  was de-insufflated. The umbilical hernia defect was closed in a transverse orientation with  interrupted 0 Ethibond sutures. Once the fascial repair was complete, the  fascia was injected with 0.5% Marcaine with epinephrine. The umbilicus was  tacked back down to the fascia with 2-0 Vicryl suture. The deep dermis was  closed with interrupted 3-0 Vicryl suture. The skin of both incisions was  closed with running 4-0 subcuticular sutures. Dermabond was then applied. The patient tolerated the procedure without difficulty and was transferred  to the recovery room in stable condition.         Colleen Lipscomb MD    D: 11/26/2017 10:40:12       T: 11/26/2017 10:43:16     [email or web address]  Job#: [id number]     Doc#: [id number]    CC:  Татьяна Fuentes MD

## 2019-08-11 LAB
ATRIAL RATE: 64 BPM
CALCULATED P AXIS, ECG09: 35 DEGREES
CALCULATED R AXIS, ECG10: 6 DEGREES
CALCULATED T AXIS, ECG11: 30 DEGREES
DIAGNOSIS, 93000: NORMAL
P-R INTERVAL, ECG05: 172 MS
Q-T INTERVAL, ECG07: 406 MS
QRS DURATION, ECG06: 86 MS
QTC CALCULATION (BEZET), ECG08: 418 MS
VENTRICULAR RATE, ECG03: 64 BPM

## 2021-04-21 ENCOUNTER — APPOINTMENT (OUTPATIENT)
Dept: GENERAL RADIOLOGY | Age: 61
End: 2021-04-21
Attending: STUDENT IN AN ORGANIZED HEALTH CARE EDUCATION/TRAINING PROGRAM
Payer: COMMERCIAL

## 2021-04-21 ENCOUNTER — HOSPITAL ENCOUNTER (EMERGENCY)
Age: 61
Discharge: HOME | End: 2021-04-21
Attending: STUDENT IN AN ORGANIZED HEALTH CARE EDUCATION/TRAINING PROGRAM
Payer: COMMERCIAL

## 2021-04-21 VITALS
DIASTOLIC BLOOD PRESSURE: 79 MMHG | RESPIRATION RATE: 16 BRPM | SYSTOLIC BLOOD PRESSURE: 117 MMHG | HEART RATE: 75 BPM | OXYGEN SATURATION: 95 % | TEMPERATURE: 96.8 F

## 2021-04-21 DIAGNOSIS — U07.1 COVID-19: Primary | ICD-10-CM

## 2021-04-21 PROCEDURE — 71045 X-RAY EXAM CHEST 1 VIEW: CPT

## 2021-04-21 PROCEDURE — 99282 EMERGENCY DEPT VISIT SF MDM: CPT

## 2021-04-21 RX ORDER — BENZONATATE 100 MG/1
200 CAPSULE ORAL
Qty: 30 CAP | Refills: 0 | Status: SHIPPED | OUTPATIENT
Start: 2021-04-21 | End: 2021-04-28

## 2021-04-21 NOTE — ED PROVIDER NOTES
Patient is a 35-year-old male with history of diabetes and hypertension here with complaints of cough and shortness of breath. Test positive for Covid 8 days ago. Continues to have daily fevers in the evenings. Cough became productive yesterday, associated with mild increase in shortness of breath. He does not have a pulse oximeter at home. Denies vomiting, diarrhea, syncope, urinary complaints. No other complaints today. Past Medical History:   Diagnosis Date    COVID-19     Diabetes ([address]     Hypertension     Ill-defined condition     elevated cholesterol       Past Surgical History:   Procedure Laterality Date    HX CAROTID STENT  12/2016    Dr Ilan Drake         History reviewed. No pertinent family history.     Social History     Socioeconomic History    Marital status:      Spouse name: Not on file    Number of children: Not on file    Years of education: Not on file    Highest education level: Not on file   Occupational History    Not on file   Social Needs    Financial resource strain: Not on file    Food insecurity     Worry: Not on file     Inability: Not on file    Transportation needs     Medical: Not on file     Non-medical: Not on file   Tobacco Use    Smoking status: Never Smoker    Smokeless tobacco: Never Used   Substance and Sexual Activity    Alcohol use: Yes     Comment: occasionally    Drug use: No    Sexual activity: Not on file   Lifestyle    Physical activity     Days per week: Not on file     Minutes per session: Not on file    Stress: Not on file   Relationships    Social connections     Talks on phone: Not on file     Gets together: Not on file     Attends Buddhism service: Not on file     Active member of club or organization: Not on file     Attends meetings of clubs or organizations: Not on file     Relationship status: Not on file    Intimate partner violence     Fear of current or ex partner: Not on file     Emotionally abused: Not on file Physically abused: Not on file     Forced sexual activity: Not on file   Other Topics Concern    Not on file   Social History Narrative    Not on file         ALLERGIES: Patient has no known allergies. Review of Systems   Constitutional: Positive for activity change, fatigue and fever. Negative for chills. HENT: Positive for congestion. Respiratory: Positive for cough (see HPI) and shortness of breath. Cardiovascular: Negative for chest pain. Gastrointestinal: Negative for abdominal pain and vomiting. Genitourinary: Negative for dysuria. Skin: Negative for rash. Neurological: Negative for syncope and headaches. All other systems reviewed and are negative. Vitals:    04/21/21 1449   BP: 117/79   Pulse: 75   Resp: 16   Temp: 96.8 °F (36 °C)   SpO2: 95%            Physical Exam  Vitals signs and nursing note reviewed. Constitutional:       General: He is not in acute distress. Appearance: He is well-developed. HENT:      Head: Normocephalic and atraumatic. Eyes:      Conjunctiva/sclera: Conjunctivae normal.   Neck:      Musculoskeletal: Normal range of motion and neck supple. Cardiovascular:      Rate and Rhythm: Normal rate and regular rhythm. Pulmonary:      Effort: Pulmonary effort is normal. No respiratory distress. Abdominal:      General: Abdomen is flat. There is no distension. Musculoskeletal:      Right lower leg: No edema. Left lower leg: No edema. Skin:     General: Skin is warm and dry. Neurological:      Mental Status: He is alert and oriented to person, place, and time. Gait: Gait is intact. MDM       Procedures    No evidence of a pneumonia on today's ED eval.  Pulse oximeter provided. Discussed return to ER precautions and expected course of disease. Patient agrees with and understands the plan. All questions answered.

## 2021-04-22 ENCOUNTER — PATIENT OUTREACH (OUTPATIENT)
Dept: CASE MANAGEMENT | Age: 61
End: 2021-04-22

## 2021-04-22 NOTE — PROGRESS NOTES
4/22/21  ACM received a call from this patient's wife today. Patient was called earlier today by this ACM for ED follow up. Patient's wife states patient's prescription for Beckie Shaggy still has not been sent to patient's pharmacy. ACM contact ED physician via [facility] earlier today, no response. ACM called patient's pharmacy, pharmacy states they did not received a prescription for patient. ACM resent note to ED physician via [facility] [employer] [facility] and called [facility] ED, left message. No response from ED physician at this time. ACM called patient's primary care requesting patient's primary care follow up with patient and prescribe an equivalent medication, if possible. ACM received telephone call from patient's primary care, Dr. Ananya Valdez office, stating prescription for Beckie Shaggy has been sent to patient's pharmacy. Patient has been informed, patient states he has his prescription.

## 2021-04-22 NOTE — PROGRESS NOTES
Patient contacted regarding [id number] diagnosis\". Discussed COVID-19 related testing which was not done at this time. Test results were not done. Patient informed of results, if available? Yes, per ED note, pt had a positive COVID-19 test on or about 21 at an outside facility prior to this ED visit. Ambulatory Care Manager contacted the patient by telephone to perform post discharge assessment. Call within 2 business days of discharge: Yes Verified name and  with patient as identifiers. Provided introduction to self, and explanation of the CTN/ACM role, and reason for call due to risk factors for infection and/or exposure to COVID-19. Symptoms reviewed with patient who verbalized the following symptoms: no new symptoms and no worsening symptoms      Due to no new or worsening symptoms encounter was not routed to provider for escalation. Discussed follow-up appointments. If no appointment was previously scheduled, appointment scheduling offered:  yes   [facility] follow up appointment(s): No future appointments. Non-[facility] follow up appointment(s): pt states not feeling better today, cough present, productive. Patient states no severe sob, cp or difficulty breathing noted. Patient states not having his prescribed medication due to pharmacy not having electronic prescription, ACM will contact pharmacy and ED. Patient states having pulse oximetry and know how to use. ACM encourage patient to monitor pulse oximetry several times daily, % normal, 88% or lower with active, return to ED. Patient states understanding. Patient states he will follow up with his primary care as needed. Advance Care Planning:   Does patient have an Advance Directive:  patient declined education. Patient has following risk factors of: diabetes and hypertension, CAD.  ACM reviewed discharge instructions, medical action plan and red flags such as increased shortness of breath, increasing fever and signs of decompensation with patient who verbalized understanding. Discussed exposure protocols and quarantine with CDC Guidelines What to do if you are sick with coronavirus disease 2019.  Patient was given an opportunity for questions and concerns. The patient agrees to contact the [facility] exposure line [phone], [facility] department  [address]  [phone] and PCP office for questions related to their healthcare. ACM provided contact information for future needs. Reviewed and educated patient on any new and changed medications related to discharge diagnosis     Was patient discharged with a pulse oximeter? yes Discussed and confirmed pulse oximeter discharge instructions and when to notify provider or seek emergency care. Patient/family/caregiver given information for [employer] and agrees to enroll yes  Patient's preferred e-mail: [email or web address]   Patient's preferred phone number: [phone]  Based on Loop alert triggers, patient will be contacted by nurse care manager for worsening symptoms. Pt will be further monitored by COVID Loop Team based on severity of symptoms and risk factors.

## 2022-03-19 PROBLEM — I20.0 UNSTABLE ANGINA (HCC): Status: ACTIVE | Noted: 2018-10-27

## 2023-05-10 RX ORDER — ASPIRIN 81 MG/1
TABLET ORAL DAILY
COMMUNITY

## 2023-05-10 RX ORDER — ATENOLOL AND CHLORTHALIDONE TABLET 100; 25 MG/1; MG/1
1 TABLET ORAL DAILY
COMMUNITY

## 2023-05-10 RX ORDER — PRASUGREL 10 MG/1
TABLET, FILM COATED ORAL DAILY
COMMUNITY
Start: 2016-12-03

## 2023-05-10 RX ORDER — ATORVASTATIN CALCIUM 40 MG/1
TABLET, FILM COATED ORAL EVERY EVENING
COMMUNITY
Start: 2016-12-03

## 2023-05-10 RX ORDER — POTASSIUM CHLORIDE 20 MEQ/1
TABLET, EXTENDED RELEASE ORAL DAILY
COMMUNITY
Start: 2016-12-03

## 2023-05-10 RX ORDER — NIACIN 500 MG/1
500 TABLET, EXTENDED RELEASE ORAL NIGHTLY
COMMUNITY

## 2023-10-22 ENCOUNTER — HOSPITAL ENCOUNTER (EMERGENCY)
Facility: HOSPITAL | Age: 63
Discharge: HOME | End: 2023-10-22
Attending: EMERGENCY MEDICINE
Payer: COMMERCIAL

## 2023-10-22 ENCOUNTER — APPOINTMENT (OUTPATIENT)
Facility: HOSPITAL | Age: 63
End: 2023-10-22
Payer: COMMERCIAL

## 2023-10-22 VITALS
OXYGEN SATURATION: 93 % | WEIGHT: 180 LBS | TEMPERATURE: 98.2 F | DIASTOLIC BLOOD PRESSURE: 87 MMHG | HEART RATE: 58 BPM | SYSTOLIC BLOOD PRESSURE: 133 MMHG | BODY MASS INDEX: 28.25 KG/M2 | RESPIRATION RATE: 14 BRPM | HEIGHT: 67 IN

## 2023-10-22 DIAGNOSIS — R07.9 RIGHT-SIDED CHEST PAIN: Primary | ICD-10-CM

## 2023-10-22 LAB
ALBUMIN SERPL-MCNC: 4.2 G/DL (ref 3.5–5)
ALBUMIN/GLOB SERPL: 1.2 (ref 1.1–2.2)
ALP SERPL-CCNC: 66 U/L (ref 45–117)
ALT SERPL-CCNC: 62 U/L (ref 12–78)
ANION GAP SERPL CALC-SCNC: 10 MMOL/L (ref 5–15)
AST SERPL-CCNC: 30 U/L (ref 15–37)
BASOPHILS # BLD: 0 K/UL (ref 0–0.1)
BASOPHILS NFR BLD: 1 % (ref 0–1)
BILIRUB SERPL-MCNC: 0.6 MG/DL (ref 0.2–1)
BUN SERPL-MCNC: 13 MG/DL (ref 6–20)
BUN/CREAT SERPL: 12 (ref 12–20)
CALCIUM SERPL-MCNC: 9.6 MG/DL (ref 8.5–10.1)
CHLORIDE SERPL-SCNC: 106 MMOL/L (ref 97–108)
CO2 SERPL-SCNC: 24 MMOL/L (ref 21–32)
COMMENT:: NORMAL
CREAT SERPL-MCNC: 1.05 MG/DL (ref 0.7–1.3)
DIFFERENTIAL METHOD BLD: ABNORMAL
EKG ATRIAL RATE: 61 BPM
EKG DIAGNOSIS: NORMAL
EKG P AXIS: 35 DEGREES
EKG P-R INTERVAL: 176 MS
EKG Q-T INTERVAL: 420 MS
EKG QRS DURATION: 84 MS
EKG QTC CALCULATION (BAZETT): 422 MS
EKG R AXIS: -1 DEGREES
EKG T AXIS: 41 DEGREES
EKG VENTRICULAR RATE: 61 BPM
EOSINOPHIL # BLD: 0.2 K/UL (ref 0–0.4)
EOSINOPHIL NFR BLD: 5 % (ref 0–7)
ERYTHROCYTE [DISTWIDTH] IN BLOOD BY AUTOMATED COUNT: 12.8 % (ref 11.5–14.5)
GLOBULIN SER CALC-MCNC: 3.6 G/DL (ref 2–4)
GLUCOSE SERPL-MCNC: 113 MG/DL (ref 65–100)
HCT VFR BLD AUTO: 46.8 % (ref 36.6–50.3)
HGB BLD-MCNC: 16.5 G/DL (ref 12.1–17)
IMM GRANULOCYTES # BLD AUTO: 0 K/UL (ref 0–0.04)
IMM GRANULOCYTES NFR BLD AUTO: 0 % (ref 0–0.5)
LIPASE SERPL-CCNC: 53 U/L (ref 13–75)
LYMPHOCYTES # BLD: 1.4 K/UL (ref 0.8–3.5)
LYMPHOCYTES NFR BLD: 28 % (ref 12–49)
MCH RBC QN AUTO: 31.2 PG (ref 26–34)
MCHC RBC AUTO-ENTMCNC: 35.3 G/DL (ref 30–36.5)
MCV RBC AUTO: 88.5 FL (ref 80–99)
MONOCYTES # BLD: 0.7 K/UL (ref 0–1)
MONOCYTES NFR BLD: 14 % (ref 5–13)
NEUTS SEG # BLD: 2.7 K/UL (ref 1.8–8)
NEUTS SEG NFR BLD: 52 % (ref 32–75)
NRBC # BLD: 0 K/UL (ref 0–0.01)
NRBC BLD-RTO: 0 PER 100 WBC
PLATELET # BLD AUTO: 207 K/UL (ref 150–400)
PMV BLD AUTO: 9.6 FL (ref 8.9–12.9)
POTASSIUM SERPL-SCNC: 3.6 MMOL/L (ref 3.5–5.1)
PROT SERPL-MCNC: 7.8 G/DL (ref 6.4–8.2)
RBC # BLD AUTO: 5.29 M/UL (ref 4.1–5.7)
SODIUM SERPL-SCNC: 140 MMOL/L (ref 136–145)
SPECIMEN HOLD: NORMAL
TROPONIN I SERPL HS-MCNC: 4 NG/L (ref 0–76)
TROPONIN I SERPL HS-MCNC: 4 NG/L (ref 0–76)
WBC # BLD AUTO: 5.1 K/UL (ref 4.1–11.1)

## 2023-10-22 PROCEDURE — 85025 COMPLETE CBC W/AUTO DIFF WBC: CPT

## 2023-10-22 PROCEDURE — 83690 ASSAY OF LIPASE: CPT

## 2023-10-22 PROCEDURE — 80053 COMPREHEN METABOLIC PANEL: CPT

## 2023-10-22 PROCEDURE — 71275 CT ANGIOGRAPHY CHEST: CPT

## 2023-10-22 PROCEDURE — 74177 CT ABD & PELVIS W/CONTRAST: CPT

## 2023-10-22 PROCEDURE — 36415 COLL VENOUS BLD VENIPUNCTURE: CPT

## 2023-10-22 PROCEDURE — 93005 ELECTROCARDIOGRAM TRACING: CPT | Performed by: NURSE PRACTITIONER

## 2023-10-22 PROCEDURE — 99285 EMERGENCY DEPT VISIT HI MDM: CPT

## 2023-10-22 PROCEDURE — 6360000004 HC RX CONTRAST MEDICATION: Performed by: RADIOLOGY

## 2023-10-22 PROCEDURE — 84484 ASSAY OF TROPONIN QUANT: CPT

## 2023-10-22 PROCEDURE — 6370000000 HC RX 637 (ALT 250 FOR IP): Performed by: EMERGENCY MEDICINE

## 2023-10-22 RX ORDER — METHOCARBAMOL 750 MG/1
750 TABLET, FILM COATED ORAL 4 TIMES DAILY
Qty: 20 TABLET | Refills: 0 | Status: SHIPPED | OUTPATIENT
Start: 2023-10-22 | End: 2023-10-22 | Stop reason: SDUPTHER

## 2023-10-22 RX ORDER — LIDOCAINE 4 G/G
1 PATCH TOPICAL
Status: DISCONTINUED | OUTPATIENT
Start: 2023-10-22 | End: 2023-10-22 | Stop reason: HOSPADM

## 2023-10-22 RX ORDER — METHOCARBAMOL 750 MG/1
750 TABLET, FILM COATED ORAL 3 TIMES DAILY
Qty: 15 TABLET | Refills: 0 | Status: SHIPPED | OUTPATIENT
Start: 2023-10-22 | End: 2023-10-27

## 2023-10-22 RX ORDER — METHOCARBAMOL 750 MG/1
750 TABLET, FILM COATED ORAL ONCE
Status: COMPLETED | OUTPATIENT
Start: 2023-10-22 | End: 2023-10-22

## 2023-10-22 RX ORDER — LIDOCAINE 50 MG/G
1 PATCH TOPICAL DAILY
Qty: 10 PATCH | Refills: 0 | Status: SHIPPED | OUTPATIENT
Start: 2023-10-22 | End: 2023-11-01

## 2023-10-22 RX ADMIN — IOPAMIDOL 100 ML: 755 INJECTION, SOLUTION INTRAVENOUS at 02:26

## 2023-10-22 RX ADMIN — METHOCARBAMOL TABLETS 750 MG: 750 TABLET, COATED ORAL at 03:23

## 2023-10-22 ASSESSMENT — PAIN - FUNCTIONAL ASSESSMENT: PAIN_FUNCTIONAL_ASSESSMENT: 0-10

## 2023-10-22 ASSESSMENT — PAIN DESCRIPTION - LOCATION: LOCATION: ARM;ABDOMEN

## 2023-10-22 ASSESSMENT — PAIN SCALES - GENERAL
PAINLEVEL_OUTOF10: 9
PAINLEVEL_OUTOF10: 10

## 2023-10-22 ASSESSMENT — PAIN DESCRIPTION - ORIENTATION: ORIENTATION: RIGHT

## 2023-10-22 NOTE — ED PROVIDER NOTES
[facility] [place] EMERGENCY [address] ENCOUNTER      Pt Name: Liz Lazaro. MRN: [id number]  Birthdate 1960  Date of evaluation: 10/22/2023  Provider: Sayda Herzog MD    [address]       Chief Complaint   Patient presents with    Abdominal Pain         HISTORY OF PRESENT ILLNESS   (Location/Symptom, Timing/Onset, Context/Setting, Quality, Duration, Modifying Factors, Severity)  Note limiting factors. Patient is a 77-year-old male with history of diabetes, hypertension, hyperlipidemia who presents with right lateral chest pain. Patient reports that he was cooking while at rest when symptoms began. Patient says that initially it was intermittent like a squeezing sensation, but then it became constant. Patient says he thinks it is coming from his lungs. Speaking full sentences in no distress. Patient says that he has had no recent illness, travel, sick contacts. Has a history of coronary artery disease with stents and is on Xarelto per his cardiologist Dr. Toby Levin. Notes that in the past his ACS episodes have not been similar to this. Since has he cannot reproduce the pain with palpation, and it is no worse with movement. The history is provided by the patient and the spouse. Nursing Notes were reviewed.     REVIEW OF SYSTEMS    Not Required     Review of Systems    PAST MEDICAL HISTORY     Past Medical History:   Diagnosis Date    COVID-19     Diabetes ([address])     Hypertension     Ill-defined condition     elevated cholesterol       SURGICAL HISTORY       Past Surgical History:   Procedure Laterality Date    CAROTID STENT PLACEMENT  12/2016    Dr London Magdaleno       Discharge Medication List as of 10/22/2023  4:58 AM        CONTINUE these medications which have NOT CHANGED    Details   aspirin 81 MG EC tablet Take by mouth dailyHistorical Med      atenolol-chlorthalidone (TENORETIC) 100-25 MG per tablet Take 1 tablet by mouth dailyHistorical Med

## 2023-10-22 NOTE — ED TRIAGE NOTES
Pt reports Rt upper abd/ chest pain and rt arm pain since 1845. States pain eased up and then came back more intense. Pt with + CAD - denies n/v/ SOB.

## 2023-10-22 NOTE — DISCHARGE INSTRUCTIONS
Please take Robaxin which is a muscle relaxer and use along with lidocaine patches to the area of pain. Your CT scans did not show any abnormalities in your lungs or abdomen. Your work-up today was negative for heart attack. Please follow-up with your PCP next week for symptom recheck. Thank you.

## 2023-10-22 NOTE — PROGRESS NOTES
12:24 AM    Pt reports worsening R sided chest pain \"around my lung\" that started around 1900 today. States that he also had the sensation is around his R arm \"feels like there was a thick sleeve on my arm. \" Feels SOB when trying to take a deep breath. (+) CAD history with stent placement x2. Doesn't feel this is necessarily cardiac related. Was cutting mushrooms when pain started. No N/V, has galbladder. No ETOH abuse. No LE swelling/ recent travel. Pt visible in pain and uncomfortable in triage, went to get Dr. Gagandeep Lemus for evaluation. I have evaluated the patient as the Provider in Rapid Medical Evaluation (RME). I have reviewed his vital signs and the triage nurse assessment. I have talked with the patient and any available family and advised that I am the provider in triage and have ordered the appropriate study to initiate their work up based on the clinical presentation during my assessment. I have advised that the patient will be accommodated in the Main ED as soon as possible. I have also requested to contact the triage nurse or myself immediately if the patient experiences any changes in their condition during this brief waiting period.   GARRETT Palm - NP

## 2023-10-22 NOTE — ED NOTES
Discharge instructions given to patient by MD and RN. Patient has been given counseling on medication use and verbalizes understanding. IV D/C. Pt ambulated off uni with no signs of distress.         Hernandez Conrad RN  10/22/23 [id number]

## 2024-06-05 ENCOUNTER — HOSPITAL ENCOUNTER (EMERGENCY)
Facility: HOSPITAL | Age: 64
Discharge: HOME | End: 2024-06-05
Attending: EMERGENCY MEDICINE
Payer: COMMERCIAL

## 2024-06-05 ENCOUNTER — APPOINTMENT (OUTPATIENT)
Facility: HOSPITAL | Age: 64
End: 2024-06-05
Payer: COMMERCIAL

## 2024-06-05 VITALS
OXYGEN SATURATION: 96 % | SYSTOLIC BLOOD PRESSURE: 144 MMHG | TEMPERATURE: 98.4 F | DIASTOLIC BLOOD PRESSURE: 71 MMHG | HEART RATE: 63 BPM | RESPIRATION RATE: 13 BRPM

## 2024-06-05 DIAGNOSIS — R07.9 CHEST PAIN WITH HIGH RISK FOR CARDIAC ETIOLOGY: Primary | ICD-10-CM

## 2024-06-05 DIAGNOSIS — I20.0 UNSTABLE ANGINA (HCC): ICD-10-CM

## 2024-06-05 LAB
ALBUMIN SERPL-MCNC: 4.2 G/DL (ref 3.5–5)
ALBUMIN/GLOB SERPL: 1.1 (ref 1.1–2.2)
ALP SERPL-CCNC: 61 U/L (ref 45–117)
ALT SERPL-CCNC: 69 U/L (ref 12–78)
ANION GAP SERPL CALC-SCNC: 6 MMOL/L (ref 5–15)
AST SERPL-CCNC: 32 U/L (ref 15–37)
BILIRUB SERPL-MCNC: 0.6 MG/DL (ref 0.2–1)
BUN SERPL-MCNC: 14 MG/DL (ref 6–20)
BUN/CREAT SERPL: 14 (ref 12–20)
CALCIUM SERPL-MCNC: 9.4 MG/DL (ref 8.5–10.1)
CHLORIDE SERPL-SCNC: 103 MMOL/L (ref 97–108)
CO2 SERPL-SCNC: 29 MMOL/L (ref 21–32)
COMMENT:: NORMAL
CREAT SERPL-MCNC: 1 MG/DL (ref 0.7–1.3)
ERYTHROCYTE [DISTWIDTH] IN BLOOD BY AUTOMATED COUNT: 13.2 % (ref 11.5–14.5)
GLOBULIN SER CALC-MCNC: 3.8 G/DL (ref 2–4)
GLUCOSE SERPL-MCNC: 126 MG/DL (ref 65–100)
HCT VFR BLD AUTO: 46.5 % (ref 36.6–50.3)
HGB BLD-MCNC: 16.7 G/DL (ref 12.1–17)
MCH RBC QN AUTO: 31.3 PG (ref 26–34)
MCHC RBC AUTO-ENTMCNC: 35.9 G/DL (ref 30–36.5)
MCV RBC AUTO: 87.1 FL (ref 80–99)
NRBC # BLD: 0 K/UL (ref 0–0.01)
NRBC BLD-RTO: 0 PER 100 WBC
PLATELET # BLD AUTO: 182 K/UL (ref 150–400)
PMV BLD AUTO: 9.6 FL (ref 8.9–12.9)
POTASSIUM SERPL-SCNC: 3.6 MMOL/L (ref 3.5–5.1)
PROT SERPL-MCNC: 8 G/DL (ref 6.4–8.2)
RBC # BLD AUTO: 5.34 M/UL (ref 4.1–5.7)
SODIUM SERPL-SCNC: 138 MMOL/L (ref 136–145)
SPECIMEN HOLD: NORMAL
SPECIMEN HOLD: NORMAL
TROPONIN I SERPL HS-MCNC: <4 NG/L (ref 0–76)
WBC # BLD AUTO: 5.6 K/UL (ref 4.1–11.1)

## 2024-06-05 PROCEDURE — 2709999900 HC NON-CHARGEABLE SUPPLY: Performed by: INTERNAL MEDICINE

## 2024-06-05 PROCEDURE — C1713 ANCHOR/SCREW BN/BN,TIS/BN: HCPCS | Performed by: INTERNAL MEDICINE

## 2024-06-05 PROCEDURE — 84484 ASSAY OF TROPONIN QUANT: CPT

## 2024-06-05 PROCEDURE — 6360000004 HC RX CONTRAST MEDICATION: Performed by: INTERNAL MEDICINE

## 2024-06-05 PROCEDURE — 6370000000 HC RX 637 (ALT 250 FOR IP): Performed by: INTERNAL MEDICINE

## 2024-06-05 PROCEDURE — 6360000002 HC RX W HCPCS: Performed by: INTERNAL MEDICINE

## 2024-06-05 PROCEDURE — 85027 COMPLETE CBC AUTOMATED: CPT

## 2024-06-05 PROCEDURE — 93005 ELECTROCARDIOGRAM TRACING: CPT | Performed by: EMERGENCY MEDICINE

## 2024-06-05 PROCEDURE — 36415 COLL VENOUS BLD VENIPUNCTURE: CPT

## 2024-06-05 PROCEDURE — 80053 COMPREHEN METABOLIC PANEL: CPT

## 2024-06-05 PROCEDURE — 71045 X-RAY EXAM CHEST 1 VIEW: CPT

## 2024-06-05 PROCEDURE — A4216 STERILE WATER/SALINE, 10 ML: HCPCS | Performed by: INTERNAL MEDICINE

## 2024-06-05 PROCEDURE — 2500000003 HC RX 250 WO HCPCS: Performed by: INTERNAL MEDICINE

## 2024-06-05 PROCEDURE — 2580000003 HC RX 258: Performed by: INTERNAL MEDICINE

## 2024-06-05 PROCEDURE — 99285 EMERGENCY DEPT VISIT HI MDM: CPT

## 2024-06-05 PROCEDURE — 99152 MOD SED SAME PHYS/QHP 5/>YRS: CPT | Performed by: INTERNAL MEDICINE

## 2024-06-05 PROCEDURE — 93454 CORONARY ARTERY ANGIO S&I: CPT | Performed by: INTERNAL MEDICINE

## 2024-06-05 PROCEDURE — 6370000000 HC RX 637 (ALT 250 FOR IP): Performed by: EMERGENCY MEDICINE

## 2024-06-05 RX ORDER — SODIUM CHLORIDE 9 MG/ML
INJECTION, SOLUTION INTRAVENOUS PRN
Status: DISCONTINUED | OUTPATIENT
Start: 2024-06-05 | End: 2024-06-05 | Stop reason: HOSPADM

## 2024-06-05 RX ORDER — LIDOCAINE HYDROCHLORIDE 10 MG/ML
INJECTION, SOLUTION INFILTRATION; PERINEURAL PRN
Status: DISCONTINUED | OUTPATIENT
Start: 2024-06-05 | End: 2024-06-05 | Stop reason: HOSPADM

## 2024-06-05 RX ORDER — ACETAMINOPHEN 325 MG/1
650 TABLET ORAL EVERY 4 HOURS PRN
Status: DISCONTINUED | OUTPATIENT
Start: 2024-06-05 | End: 2024-06-05 | Stop reason: HOSPADM

## 2024-06-05 RX ORDER — SODIUM CHLORIDE 0.9 % (FLUSH) 0.9 %
5-40 SYRINGE (ML) INJECTION EVERY 12 HOURS SCHEDULED
Status: DISCONTINUED | OUTPATIENT
Start: 2024-06-05 | End: 2024-06-05 | Stop reason: HOSPADM

## 2024-06-05 RX ORDER — FENTANYL CITRATE 50 UG/ML
INJECTION, SOLUTION INTRAMUSCULAR; INTRAVENOUS PRN
Status: DISCONTINUED | OUTPATIENT
Start: 2024-06-05 | End: 2024-06-05 | Stop reason: HOSPADM

## 2024-06-05 RX ORDER — ASPIRIN 325 MG
325 TABLET ORAL
Status: COMPLETED | OUTPATIENT
Start: 2024-06-05 | End: 2024-06-05

## 2024-06-05 RX ORDER — VERAPAMIL HYDROCHLORIDE 2.5 MG/ML
INJECTION, SOLUTION INTRAVENOUS PRN
Status: DISCONTINUED | OUTPATIENT
Start: 2024-06-05 | End: 2024-06-05 | Stop reason: HOSPADM

## 2024-06-05 RX ORDER — SODIUM CHLORIDE 0.9 % (FLUSH) 0.9 %
5-40 SYRINGE (ML) INJECTION PRN
Status: DISCONTINUED | OUTPATIENT
Start: 2024-06-05 | End: 2024-06-05 | Stop reason: HOSPADM

## 2024-06-05 RX ORDER — MIDAZOLAM HYDROCHLORIDE 1 MG/ML
INJECTION INTRAMUSCULAR; INTRAVENOUS PRN
Status: DISCONTINUED | OUTPATIENT
Start: 2024-06-05 | End: 2024-06-05 | Stop reason: HOSPADM

## 2024-06-05 RX ORDER — HEPARIN SODIUM 1000 [USP'U]/ML
INJECTION, SOLUTION INTRAVENOUS; SUBCUTANEOUS PRN
Status: DISCONTINUED | OUTPATIENT
Start: 2024-06-05 | End: 2024-06-05 | Stop reason: HOSPADM

## 2024-06-05 RX ADMIN — ALUMINUM HYDROXIDE, MAGNESIUM HYDROXIDE, AND SIMETHICONE 40 ML: 1200; 120; 1200 SUSPENSION ORAL at 16:36

## 2024-06-05 RX ADMIN — FAMOTIDINE 20 MG: 10 INJECTION, SOLUTION INTRAVENOUS at 16:32

## 2024-06-05 RX ADMIN — ASPIRIN 325 MG: 325 TABLET ORAL at 14:26

## 2024-06-05 ASSESSMENT — ENCOUNTER SYMPTOMS
CONSTIPATION: 0
ABDOMINAL PAIN: 0
BACK PAIN: 0
NAUSEA: 0
TROUBLE SWALLOWING: 0
COLOR CHANGE: 0
VOMITING: 0
SHORTNESS OF BREATH: 1
RHINORRHEA: 0
DIARRHEA: 0
SHORTNESS OF BREATH: 0

## 2024-06-05 ASSESSMENT — PAIN SCALES - GENERAL
PAINLEVEL_OUTOF10: 5
PAINLEVEL_OUTOF10: 2
PAINLEVEL_OUTOF10: 2

## 2024-06-05 ASSESSMENT — PAIN DESCRIPTION - PAIN TYPE: TYPE: ACUTE PAIN

## 2024-06-05 ASSESSMENT — PAIN - FUNCTIONAL ASSESSMENT: PAIN_FUNCTIONAL_ASSESSMENT: ACTIVITIES ARE NOT PREVENTED

## 2024-06-05 ASSESSMENT — PAIN DESCRIPTION - DESCRIPTORS: DESCRIPTORS: BURNING

## 2024-06-05 ASSESSMENT — PAIN DESCRIPTION - ORIENTATION: ORIENTATION: LEFT

## 2024-06-05 ASSESSMENT — PAIN DESCRIPTION - LOCATION: LOCATION: CHEST

## 2024-06-05 NOTE — PROCEDURES
Cardiac Catheterization Procedure Note   Patient: Stanford Kruger Jr.  MRN: [id number]  SSN: [id number]   YOB: 1960 Age: 64 y.o.  Sex: male    Date of Procedure: 6/5/2024   Pre-procedure Diagnosis: Coronary Artery Disease and Unstable Angina  Post-procedure Diagnosis: Non-cardiac Chest Pain  Procedure: Left Heart Cath  :  Dr. Ulises George MD    Assistant(s):  None  Anesthesia: Moderate Sedation   Estimated Blood Loss: Less than 10 mL   Specimens Removed: None    Access: left radial artery    Findings:   Left main: normal  LAD: patent stent in the proximal LAD with no significant ISR  Lcx; no significant disease  RCA: large dominant vessel with a patent stent in the proximal RCA with no significant ISR    Impression: non-cardiac chest pain    Complications: None   Implants:  None  Signed by:  Ulises George MD  6/5/2024  3:27 PM

## 2024-06-05 NOTE — PROGRESS NOTES
Ambulated patient to the bathroom with a steady gait, voided without difficulty. Patient denies chest pain, shortness of breath, or dizziness. Returned to stretcher. Vital signs stable.     Procedural site is clean, dry, and intact. No bleeding, no hematoma.     Assisted patient in dressing/Patient dressed self.     Educated patient about their sedation precautions such as not driving, operating any machinery, or signing legal documents 24 hours post procedure.     Reviewed discharge instructions, including medications and site care using the teach back method.  Answered all questions. Verbalized understanding.     Removed peripheral IV.    Escorted to discharge area in a wheelchair with all of their belongings including cell phone and discharge instructions.    Patient's spouse /Kina present to take patient home. Reviewed discharge instructions with patients' spouse, they verbalized understanding.

## 2024-06-05 NOTE — ED NOTES
TRANSFER - OUT REPORT:    Verbal report given to Janine shook Stanford Kruger   being transferred to Cath lab for ordered procedure       Report consisted of patient's Situation, Background, Assessment and   Recommendations(SBAR).     Information from the following report(s) Nurse Handoff Report, Index, ED Encounter Summary, ED SBAR, MAR, and Recent Results was reviewed with the receiving nurse.    Kinder Fall Assessment:                           Lines:   Peripheral IV 06/05/24 Left Antecubital (Active)   Site Assessment Clean, dry & intact 06/05/24 1353   Line Status Blood return noted;Specimen collected;Normal saline locked;Flushed 06/05/24 1353   Line Care Connections checked and tightened 06/05/24 1353   Phlebitis Assessment No symptoms 06/05/24 1353   Infiltration Assessment 0 06/05/24 1353   Dressing Status New dressing applied;Clean, dry & intact 06/05/24 1353   Dressing Type Transparent 06/05/24 1353   Dressing Intervention New 06/05/24 1353        Opportunity for questions and clarification was provided.      Patient transported with:  Registered Nurse

## 2024-06-05 NOTE — PROGRESS NOTES
CATH LAB to RECOVERY ROOM REPORT    Procedure: [facility]    MD: ELIAN George MD    The procedure was diagnostic only.    Verbal Report given to Recovery Nurse on patient being transferred to Cardiac Cath Lab RR for routine post-op. Patient stable upon transfer to .  Vitals, mental status, MAR, procedural summary discussed with recovery RN.    Medication given during procedure:    Contrast:30mL                          Heparin:4000units     Versed:3mg                                                               Fentanyl:50mcg                                                             Sheaths:    Right radial sheath pulled at 1524 , band at 13mL of air

## 2024-06-05 NOTE — PROGRESS NOTES
TRANSFER - IN REPORT:    Verbal report received from Miriam PHAM on Stanford Kruger Jr.  being received from procedurea  area for routine progression of patient care. Report consisted of patient’s Situation, Background, Assessment and Recommendations(SBAR). Information from the following report(s)SBAR, Procedure Summary, Intake/Output, MAR, Recent Results, and Cardiac Rhythm NSR  was reviewed with the receiving clinician. Opportunity for questions and clarification was provided. Assessment completed upon patient’s arrival to Cardiac Cath Lab RECOVERY AREA and care assumed.       Cardiac Cath Lab Recovery Arrival Note:    Stanford Kruger Jr. arrived to CCL recovery area.  Patient procedure= LHC. Patient on cardiac monitor, non-invasive blood pressure, SPO2 monitor. On Room Air .  IV  of NS on pump at 50 ml/hr. Patient status doing well without problems. Patient is A&Ox 4. Patient reports No Pain.    PROCEDURE SITE CHECK:    Procedure site:without any bleeding and No Hematoma, No pain/discomfort reported at procedure site.     No change in patient status. Continue to monitor patient and status.

## 2024-06-05 NOTE — CONSULTS
S Cardiology Consult Note    Date of consult:  06/05/24  Date of admission: 6/5/2024  Primary Cardiologist: Ariel  Physician Requesting consult: Dr Meyer    CC / Reason for consult:   Chief Complaint   Patient presents with    Chest Pain        History of the presenting illness:  Stanford Kruger Jr. is a 64 y.o. M with a known history of CAD who presents to the [facility] ER with chest pain.  He had an MRI on his shoulder on Monday  Yesterday he started having burning chest pain in the center of his chest.  It has been constant since then, rated at 5/10 severity.  No relieving factors  Quality feels like indigestion but similar to prior anginal pain.     He has torn tendons in the right shoulder and was being evaluated for that with the MRI.    Past Medical History:   Diagnosis Date    COVID-19     Diabetes (HCC)     Hypertension     Ill-defined condition     elevated cholesterol   CAD    Prior to Admission medications    Medication Sig Start Date End Date Taking? Authorizing Provider   aspirin 81 MG EC tablet Take by mouth daily    Automatic Reconciliation, Ar   atenolol-chlorthalidone (TENORETIC) 100-25 MG per tablet Take 1 tablet by mouth daily    Automatic Reconciliation, Ar   atorvastatin (LIPITOR) 40 MG tablet Take by mouth every evening 12/3/16   Automatic Reconciliation, Ar   niacin (NIASPAN) 500 MG extended release tablet Take 1 tablet by mouth nightly    Automatic Reconciliation, Ar   potassium chloride (KLOR-CON M) 20 MEQ extended release tablet Take by mouth daily 12/3/16   Automatic Reconciliation, Ar   prasugrel (EFFIENT) 10 MG TABS Take by mouth daily 12/3/16   Automatic Reconciliation, Ar       No Known Allergies     No family history on file.  No pertinent family history    Social History     Socioeconomic History    Marital status:      Spouse name: Not on file    Number of children: Not on file    Years of education: Not on file    Highest education level: Not on file   Occupational

## 2024-06-05 NOTE — ED TRIAGE NOTES
Patient arrives with chest pain/burning that started yesterday. Had MRI Monday and during MRI felt horrible pain pulsating through chest. Wasn't told to remove ring and also has screw in right shoulder.     Hx stents

## 2024-06-05 NOTE — ED PROVIDER NOTES
[facility] EMERGENCY DEP  EMERGENCY DEPARTMENT ENCOUNTER      Pt Name: Stanford Kruger Jr.  MRN: [id number]  Birthdate 1960  Date of evaluation: 6/5/2024  Provider: Sheldon Meyer MD    CHIEF COMPLAINT       Chief Complaint   Patient presents with   • Chest Pain         HISTORY OF PRESENT ILLNESS   (Location/Symptom, Timing/Onset, Context/Setting, Quality, Duration, Modifying Factors, Severity)  Note limiting factors.   Stanford Kruger Jr. is a 64 y.o. male who presents to the emergency department      The history is provided by the patient. No  was used.   Chest Pain  Pain location:  Substernal area  Pain quality: burning    Pain radiates to:  Does not radiate  Pain severity:  Moderate  Onset quality:  Sudden  Timing:  Constant  Progression:  Unchanged  Chronicity:  New  Ineffective treatments:  None tried  Associated symptoms: shortness of breath    Associated symptoms: no abdominal pain, no back pain, no dizziness, no fever, no headache, no nausea, no numbness, no palpitations, no vomiting and no weakness    Risk factors: coronary artery disease        Nursing Notes were reviewed.    REVIEW OF SYSTEMS    (2-9 systems for level 4, 10 or more for level 5)     Review of Systems   Constitutional:  Negative for activity change, chills and fever.   HENT:  Negative for nosebleeds.    Eyes:  Negative for visual disturbance.   Respiratory:  Positive for shortness of breath.    Cardiovascular:  Positive for chest pain. Negative for palpitations.   Gastrointestinal:  Negative for abdominal pain, constipation, diarrhea, nausea and vomiting.   Genitourinary:  Negative for difficulty urinating, dysuria, hematuria and urgency.   Musculoskeletal:  Negative for back pain, neck pain and neck stiffness.   Skin:  Negative for color change.   Allergic/Immunologic: Negative for immunocompromised state.   Neurological:  Negative for dizziness, seizures, syncope, weakness, light-headedness, numbness and headaches.

## 2024-06-06 LAB
EKG ATRIAL RATE: 58 BPM
EKG DIAGNOSIS: NORMAL
EKG P AXIS: 33 DEGREES
EKG P-R INTERVAL: 178 MS
EKG Q-T INTERVAL: 432 MS
EKG QRS DURATION: 88 MS
EKG QTC CALCULATION (BAZETT): 424 MS
EKG R AXIS: 5 DEGREES
EKG T AXIS: 40 DEGREES
EKG VENTRICULAR RATE: 58 BPM

## (undated) DEVICE — SPLINT WR VELC FOAM NEUT POS DISP FOR RAD ART ACC SFT STRP

## (undated) DEVICE — KIT HND CTRL 3 W STPCOCK ROT END 54IN PREM HI PRSS TBNG AT

## (undated) DEVICE — KIT MED IMAG CNTRST AGNT W/ IOPAMIDOL REUSE

## (undated) DEVICE — MEDTRONIC JL3.5 DIAGNOSTIC CATHETER

## (undated) DEVICE — ANGIOGRAPHY KIT

## (undated) DEVICE — SPECIAL PROCEDURE DRAPE 32" X 34": Brand: SPECIAL PROCEDURE DRAPE

## (undated) DEVICE — TR BAND RADIAL ARTERY COMPRESSION DEVICE: Brand: TR BAND

## (undated) DEVICE — PADPRO DEFIBRILLATION/PACING/CARDIOVERSION/MONITORING ELECTRODES, ADULT/CHILD GREATER THAN 10 KG RADIOTRANSPARENT ELECTRODE, PHYSIO-CONTROL QUIK-COMBO (M) 60" (152 CM): Brand: PADPRO

## (undated) DEVICE — KIT AT-X65 ANGIOTOUCH HAND CONTROLLER

## (undated) DEVICE — PACK PROCEDURE SURG HRT CATH

## (undated) DEVICE — KIT MFLD ISOLATN NACL CNTRST PRT TBNG SPIK W/ PRSS TRNSDUC

## (undated) DEVICE — ANGIOGRAPHIC CATHETER: Brand: IMPULSE™

## (undated) DEVICE — WASTE KIT - ST MARY: Brand: MEDLINE INDUSTRIES, INC.